# Patient Record
Sex: MALE | Race: WHITE | NOT HISPANIC OR LATINO | Employment: OTHER | URBAN - METROPOLITAN AREA
[De-identification: names, ages, dates, MRNs, and addresses within clinical notes are randomized per-mention and may not be internally consistent; named-entity substitution may affect disease eponyms.]

---

## 2016-02-08 LAB
CRC RECOMMENDATION EXT: NORMAL
CRC RECOMMENDATION EXT: NORMAL

## 2017-03-16 ENCOUNTER — HISTORICAL (OUTPATIENT)
Dept: ADMINISTRATIVE | Facility: HOSPITAL | Age: 67
End: 2017-03-16

## 2017-09-21 ENCOUNTER — HISTORICAL (OUTPATIENT)
Dept: ADMINISTRATIVE | Facility: HOSPITAL | Age: 67
End: 2017-09-21

## 2017-09-21 LAB
ALBUMIN SERPL-MCNC: 3.7 GM/DL (ref 3.4–5)
ALBUMIN/GLOB SERPL: 1.3 {RATIO}
ALP SERPL-CCNC: 70 UNIT/L (ref 50–136)
ALT SERPL-CCNC: 27 UNIT/L (ref 12–78)
AST SERPL-CCNC: 14 UNIT/L (ref 15–37)
BILIRUB SERPL-MCNC: 0.7 MG/DL (ref 0.2–1)
BILIRUBIN DIRECT+TOT PNL SERPL-MCNC: 0.2 MG/DL (ref 0–0.2)
BILIRUBIN DIRECT+TOT PNL SERPL-MCNC: 0.5 MG/DL (ref 0–0.8)
BUN SERPL-MCNC: 24 MG/DL (ref 7–18)
CALCIUM SERPL-MCNC: 8.7 MG/DL (ref 8.5–10.1)
CHLORIDE SERPL-SCNC: 103 MMOL/L (ref 98–107)
CHOLEST SERPL-MCNC: 171 MG/DL (ref 0–200)
CHOLEST/HDLC SERPL: 2.2 {RATIO} (ref 0–5)
CO2 SERPL-SCNC: 28 MMOL/L (ref 21–32)
CREAT SERPL-MCNC: 0.87 MG/DL (ref 0.7–1.3)
CREAT UR-MCNC: 89 MG/DL
EST. AVERAGE GLUCOSE BLD GHB EST-MCNC: 154 MG/DL
GLOBULIN SER-MCNC: 2.8 GM/DL (ref 2.4–3.5)
GLUCOSE SERPL-MCNC: 207 MG/DL (ref 74–106)
HBA1C MFR BLD: 7 % (ref 4.2–6.3)
HDLC SERPL-MCNC: 78 MG/DL (ref 35–60)
LDLC SERPL CALC-MCNC: 79 MG/DL (ref 0–129)
MICROALBUMIN UR-MCNC: 0.6 MG/DL
MICROALBUMIN/CREAT RATIO PNL UR: 6.7 MG/GM CR (ref 0–30)
POTASSIUM SERPL-SCNC: 4.2 MMOL/L (ref 3.5–5.1)
PROT SERPL-MCNC: 6.5 GM/DL (ref 6.4–8.2)
SODIUM SERPL-SCNC: 138 MMOL/L (ref 136–145)
TRIGL SERPL-MCNC: 72 MG/DL (ref 30–150)
VLDLC SERPL CALC-MCNC: 14 MG/DL

## 2018-03-21 ENCOUNTER — HISTORICAL (OUTPATIENT)
Dept: ADMINISTRATIVE | Facility: HOSPITAL | Age: 68
End: 2018-03-21

## 2018-03-21 LAB
ABS NEUT (OLG): 2.9 X10(3)/MCL (ref 2.1–9.2)
ALBUMIN SERPL-MCNC: 3.8 GM/DL (ref 3.4–5)
ALBUMIN/GLOB SERPL: 1.2 {RATIO}
ALP SERPL-CCNC: 83 UNIT/L (ref 50–136)
ALT SERPL-CCNC: 48 UNIT/L (ref 12–78)
AST SERPL-CCNC: 29 UNIT/L (ref 15–37)
BASOPHILS # BLD AUTO: 0 X10(3)/MCL (ref 0–0.2)
BASOPHILS NFR BLD AUTO: 1 %
BILIRUB SERPL-MCNC: 0.6 MG/DL (ref 0.2–1)
BILIRUBIN DIRECT+TOT PNL SERPL-MCNC: 0.2 MG/DL (ref 0–0.2)
BILIRUBIN DIRECT+TOT PNL SERPL-MCNC: 0.4 MG/DL (ref 0–0.8)
BUN SERPL-MCNC: 17 MG/DL (ref 7–18)
CALCIUM SERPL-MCNC: 9.1 MG/DL (ref 8.5–10.1)
CEA SERPL-MCNC: 2 NG/ML (ref 0–3)
CHLORIDE SERPL-SCNC: 104 MMOL/L (ref 98–107)
CHOLEST SERPL-MCNC: 194 MG/DL (ref 0–200)
CHOLEST/HDLC SERPL: 2.3 {RATIO} (ref 0–5)
CO2 SERPL-SCNC: 30 MMOL/L (ref 21–32)
CREAT SERPL-MCNC: 0.81 MG/DL (ref 0.7–1.3)
EOSINOPHIL # BLD AUTO: 0 X10(3)/MCL (ref 0–0.9)
EOSINOPHIL NFR BLD AUTO: 1 %
ERYTHROCYTE [DISTWIDTH] IN BLOOD BY AUTOMATED COUNT: 12.2 % (ref 11.5–17)
EST. AVERAGE GLUCOSE BLD GHB EST-MCNC: 143 MG/DL
GLOBULIN SER-MCNC: 3.1 GM/DL (ref 2.4–3.5)
GLUCOSE SERPL-MCNC: 171 MG/DL (ref 74–106)
HBA1C MFR BLD: 6.6 % (ref 4.2–6.3)
HCT VFR BLD AUTO: 43.5 % (ref 42–52)
HDLC SERPL-MCNC: 85 MG/DL (ref 35–60)
HGB BLD-MCNC: 15.4 GM/DL (ref 14–18)
LDLC SERPL CALC-MCNC: 93 MG/DL (ref 0–129)
LYMPHOCYTES # BLD AUTO: 1.1 X10(3)/MCL (ref 0.6–4.6)
LYMPHOCYTES NFR BLD AUTO: 24 %
MCH RBC QN AUTO: 33.9 PG (ref 27–31)
MCHC RBC AUTO-ENTMCNC: 35.4 GM/DL (ref 33–36)
MCV RBC AUTO: 95.8 FL (ref 80–94)
MONOCYTES # BLD AUTO: 0.6 X10(3)/MCL (ref 0.1–1.3)
MONOCYTES NFR BLD AUTO: 12 %
NEUTROPHILS # BLD AUTO: 2.9 X10(3)/MCL (ref 2.1–9.2)
NEUTROPHILS NFR BLD AUTO: 62 %
PLATELET # BLD AUTO: 132 X10(3)/MCL (ref 130–400)
PMV BLD AUTO: 8.8 FL (ref 9.4–12.4)
POTASSIUM SERPL-SCNC: 4.1 MMOL/L (ref 3.5–5.1)
PROT SERPL-MCNC: 6.9 GM/DL (ref 6.4–8.2)
PSA SERPL-MCNC: 2.35 NG/ML (ref 0–4)
RBC # BLD AUTO: 4.54 X10(6)/MCL (ref 4.7–6.1)
SODIUM SERPL-SCNC: 140 MMOL/L (ref 136–145)
TRIGL SERPL-MCNC: 79 MG/DL (ref 30–150)
TSH SERPL-ACNC: 2.5 MIU/ML (ref 0.36–3.74)
VLDLC SERPL CALC-MCNC: 16 MG/DL
WBC # SPEC AUTO: 4.7 X10(3)/MCL (ref 4.5–11.5)

## 2018-10-30 ENCOUNTER — HISTORICAL (OUTPATIENT)
Dept: ADMINISTRATIVE | Facility: HOSPITAL | Age: 68
End: 2018-10-30

## 2018-10-30 LAB
ABS NEUT (OLG): 3.1 X10(3)/MCL (ref 2.1–9.2)
ALBUMIN SERPL-MCNC: 4.1 GM/DL (ref 3.4–5)
ALBUMIN/GLOB SERPL: 1.3 RATIO (ref 1.1–2)
ALP SERPL-CCNC: 76 UNIT/L (ref 50–136)
ALT SERPL-CCNC: 49 UNIT/L (ref 12–78)
AST SERPL-CCNC: 31 UNIT/L (ref 15–37)
BASOPHILS # BLD AUTO: 0 X10(3)/MCL (ref 0–0.2)
BASOPHILS NFR BLD AUTO: 1 %
BILIRUB SERPL-MCNC: 0.6 MG/DL (ref 0.2–1)
BILIRUBIN DIRECT+TOT PNL SERPL-MCNC: 0.2 MG/DL (ref 0–0.5)
BILIRUBIN DIRECT+TOT PNL SERPL-MCNC: 0.4 MG/DL (ref 0–0.8)
BUN SERPL-MCNC: 20 MG/DL (ref 7–18)
CALCIUM SERPL-MCNC: 9.1 MG/DL (ref 8.5–10.1)
CHLORIDE SERPL-SCNC: 104 MMOL/L (ref 98–107)
CHOLEST SERPL-MCNC: 175 MG/DL (ref 0–200)
CHOLEST/HDLC SERPL: 2.2 {RATIO} (ref 0–5)
CO2 SERPL-SCNC: 30 MMOL/L (ref 21–32)
CREAT SERPL-MCNC: 0.94 MG/DL (ref 0.7–1.3)
CREAT UR-MCNC: 86 MG/DL
EOSINOPHIL # BLD AUTO: 0.1 X10(3)/MCL (ref 0–0.9)
EOSINOPHIL NFR BLD AUTO: 1 %
ERYTHROCYTE [DISTWIDTH] IN BLOOD BY AUTOMATED COUNT: 12.1 % (ref 11.5–17)
EST. AVERAGE GLUCOSE BLD GHB EST-MCNC: 151 MG/DL
GLOBULIN SER-MCNC: 3.2 GM/DL (ref 2.4–3.5)
GLUCOSE SERPL-MCNC: 184 MG/DL (ref 74–106)
HBA1C MFR BLD: 6.9 % (ref 4.2–6.3)
HCT VFR BLD AUTO: 46.5 % (ref 42–52)
HDLC SERPL-MCNC: 79 MG/DL (ref 35–60)
HGB BLD-MCNC: 15.6 GM/DL (ref 14–18)
LDLC SERPL CALC-MCNC: 70 MG/DL (ref 0–129)
LYMPHOCYTES # BLD AUTO: 1.1 X10(3)/MCL (ref 0.6–4.6)
LYMPHOCYTES NFR BLD AUTO: 23 %
MCH RBC QN AUTO: 33.2 PG (ref 27–31)
MCHC RBC AUTO-ENTMCNC: 33.5 GM/DL (ref 33–36)
MCV RBC AUTO: 98.9 FL (ref 80–94)
MICROALBUMIN UR-MCNC: 0.6 MG/DL
MICROALBUMIN/CREAT RATIO PNL UR: 7 MG/GM CR (ref 0–30)
MONOCYTES # BLD AUTO: 0.5 X10(3)/MCL (ref 0.1–1.3)
MONOCYTES NFR BLD AUTO: 11 %
NEUTROPHILS # BLD AUTO: 3.1 X10(3)/MCL (ref 2.1–9.2)
NEUTROPHILS NFR BLD AUTO: 64 %
PLATELET # BLD AUTO: 153 X10(3)/MCL (ref 130–400)
PMV BLD AUTO: 8.8 FL (ref 9.4–12.4)
POTASSIUM SERPL-SCNC: 4.7 MMOL/L (ref 3.5–5.1)
PROT SERPL-MCNC: 7.3 GM/DL (ref 6.4–8.2)
RBC # BLD AUTO: 4.7 X10(6)/MCL (ref 4.7–6.1)
SODIUM SERPL-SCNC: 139 MMOL/L (ref 136–145)
TRIGL SERPL-MCNC: 129 MG/DL (ref 30–150)
TSH SERPL-ACNC: 2.19 MIU/L (ref 0.36–3.74)
VLDLC SERPL CALC-MCNC: 26 MG/DL
WBC # SPEC AUTO: 4.9 X10(3)/MCL (ref 4.5–11.5)

## 2019-07-01 ENCOUNTER — HISTORICAL (OUTPATIENT)
Dept: ADMINISTRATIVE | Facility: HOSPITAL | Age: 69
End: 2019-07-01

## 2019-07-01 LAB
ALBUMIN SERPL-MCNC: 3.8 GM/DL (ref 3.4–5)
ALBUMIN/GLOB SERPL: 1.2 {RATIO}
ALP SERPL-CCNC: 60 UNIT/L (ref 50–136)
ALT SERPL-CCNC: 26 UNIT/L (ref 12–78)
AST SERPL-CCNC: 18 UNIT/L (ref 15–37)
BILIRUB SERPL-MCNC: 1.2 MG/DL (ref 0.2–1)
BILIRUBIN DIRECT+TOT PNL SERPL-MCNC: 0.3 MG/DL (ref 0–0.2)
BILIRUBIN DIRECT+TOT PNL SERPL-MCNC: 0.9 MG/DL (ref 0–0.8)
BUN SERPL-MCNC: 22 MG/DL (ref 7–18)
CALCIUM SERPL-MCNC: 9 MG/DL (ref 8.5–10.1)
CEA SERPL-MCNC: 1.4 NG/ML (ref 0–3)
CHLORIDE SERPL-SCNC: 101 MMOL/L (ref 98–107)
CHOLEST SERPL-MCNC: 172 MG/DL (ref 0–200)
CHOLEST/HDLC SERPL: 2.1 {RATIO} (ref 0–5)
CO2 SERPL-SCNC: 29 MMOL/L (ref 21–32)
CREAT SERPL-MCNC: 1 MG/DL (ref 0.7–1.3)
EST. AVERAGE GLUCOSE BLD GHB EST-MCNC: 120 MG/DL
GLOBULIN SER-MCNC: 3.1 GM/DL (ref 2.4–3.5)
GLUCOSE SERPL-MCNC: 148 MG/DL (ref 74–106)
HBA1C MFR BLD: 5.8 % (ref 4.2–6.3)
HDLC SERPL-MCNC: 81 MG/DL (ref 35–60)
LDLC SERPL CALC-MCNC: 74 MG/DL (ref 0–129)
POTASSIUM SERPL-SCNC: 4.2 MMOL/L (ref 3.5–5.1)
PROT SERPL-MCNC: 6.9 GM/DL (ref 6.4–8.2)
PSA SERPL-MCNC: 2.79 NG/ML (ref 0–4)
SODIUM SERPL-SCNC: 136 MMOL/L (ref 136–145)
TRIGL SERPL-MCNC: 84 MG/DL (ref 30–150)
VLDLC SERPL CALC-MCNC: 17 MG/DL

## 2020-01-07 ENCOUNTER — HISTORICAL (OUTPATIENT)
Dept: ADMINISTRATIVE | Facility: HOSPITAL | Age: 70
End: 2020-01-07

## 2020-01-07 LAB
ABS NEUT (OLG): 2.88 X10(3)/MCL (ref 2.1–9.2)
ALBUMIN SERPL-MCNC: 3.6 GM/DL (ref 3.4–5)
ALBUMIN/GLOB SERPL: 1.1 RATIO (ref 1.1–2)
ALP SERPL-CCNC: 77 UNIT/L (ref 50–136)
ALT SERPL-CCNC: 32 UNIT/L (ref 12–78)
AST SERPL-CCNC: 21 UNIT/L (ref 15–37)
BASOPHILS # BLD AUTO: 0.1 X10(3)/MCL (ref 0–0.2)
BASOPHILS NFR BLD AUTO: 2 %
BILIRUB SERPL-MCNC: 0.4 MG/DL (ref 0.2–1)
BILIRUBIN DIRECT+TOT PNL SERPL-MCNC: 0.1 MG/DL (ref 0–0.5)
BILIRUBIN DIRECT+TOT PNL SERPL-MCNC: 0.3 MG/DL (ref 0–0.8)
BUN SERPL-MCNC: 17 MG/DL (ref 7–18)
CALCIUM SERPL-MCNC: 9.2 MG/DL (ref 8.5–10.1)
CHLORIDE SERPL-SCNC: 106 MMOL/L (ref 98–107)
CHOLEST SERPL-MCNC: 183 MG/DL (ref 0–200)
CHOLEST/HDLC SERPL: 2.7 {RATIO} (ref 0–5)
CO2 SERPL-SCNC: 27 MMOL/L (ref 21–32)
CREAT SERPL-MCNC: 0.85 MG/DL (ref 0.7–1.3)
CREAT UR-MCNC: 58 MG/DL
EOSINOPHIL # BLD AUTO: 0.1 X10(3)/MCL (ref 0–0.9)
EOSINOPHIL NFR BLD AUTO: 2 %
ERYTHROCYTE [DISTWIDTH] IN BLOOD BY AUTOMATED COUNT: 11.7 % (ref 11.5–17)
EST. AVERAGE GLUCOSE BLD GHB EST-MCNC: 134 MG/DL
GLOBULIN SER-MCNC: 3.3 GM/DL (ref 2.4–3.5)
GLUCOSE SERPL-MCNC: 205 MG/DL (ref 74–106)
HBA1C MFR BLD: 6.3 % (ref 4.2–6.3)
HCT VFR BLD AUTO: 45.8 % (ref 42–52)
HDLC SERPL-MCNC: 69 MG/DL (ref 35–60)
HGB BLD-MCNC: 15.8 GM/DL (ref 14–18)
LDLC SERPL CALC-MCNC: 72 MG/DL (ref 0–129)
LYMPHOCYTES # BLD AUTO: 1.9 X10(3)/MCL (ref 0.6–4.6)
LYMPHOCYTES NFR BLD AUTO: 35 %
MCH RBC QN AUTO: 33.5 PG (ref 27–31)
MCHC RBC AUTO-ENTMCNC: 34.5 GM/DL (ref 33–36)
MCV RBC AUTO: 97 FL (ref 80–94)
MICROALBUMIN UR-MCNC: 0.5 MG/DL
MICROALBUMIN/CREAT RATIO PNL UR: 8.6 MG/GM CR (ref 0–30)
MONOCYTES # BLD AUTO: 0.5 X10(3)/MCL (ref 0.1–1.3)
MONOCYTES NFR BLD AUTO: 8 %
NEUTROPHILS # BLD AUTO: 2.88 X10(3)/MCL (ref 2.1–9.2)
NEUTROPHILS NFR BLD AUTO: 52 %
PLATELET # BLD AUTO: 221 X10(3)/MCL (ref 130–400)
PMV BLD AUTO: 8.5 FL (ref 9.4–12.4)
POTASSIUM SERPL-SCNC: 3.8 MMOL/L (ref 3.5–5.1)
PROT SERPL-MCNC: 6.9 GM/DL (ref 6.4–8.2)
RBC # BLD AUTO: 4.72 X10(6)/MCL (ref 4.7–6.1)
SODIUM SERPL-SCNC: 142 MMOL/L (ref 136–145)
TRIGL SERPL-MCNC: 208 MG/DL (ref 30–150)
TSH SERPL-ACNC: 2.75 MIU/L (ref 0.36–3.74)
VLDLC SERPL CALC-MCNC: 42 MG/DL
WBC # SPEC AUTO: 5.5 X10(3)/MCL (ref 4.5–11.5)

## 2020-07-14 ENCOUNTER — HISTORICAL (OUTPATIENT)
Dept: ADMINISTRATIVE | Facility: HOSPITAL | Age: 70
End: 2020-07-14

## 2020-07-14 LAB
ABS NEUT (OLG): 3.56 X10(3)/MCL (ref 2.1–9.2)
ALBUMIN SERPL-MCNC: 4.1 GM/DL (ref 3.4–5)
ALBUMIN/GLOB SERPL: 1.5 RATIO (ref 1.1–2)
ALP SERPL-CCNC: 62 UNIT/L (ref 40–150)
ALT SERPL-CCNC: 28 UNIT/L (ref 0–55)
AST SERPL-CCNC: 23 UNIT/L (ref 5–34)
BASOPHILS # BLD AUTO: 0 X10(3)/MCL (ref 0–0.2)
BASOPHILS NFR BLD AUTO: 1 %
BILIRUB SERPL-MCNC: 1.2 MG/DL
BILIRUBIN DIRECT+TOT PNL SERPL-MCNC: 0.4 MG/DL (ref 0–0.5)
BILIRUBIN DIRECT+TOT PNL SERPL-MCNC: 0.8 MG/DL (ref 0–0.8)
BUN SERPL-MCNC: 22.5 MG/DL (ref 8.4–25.7)
CALCIUM SERPL-MCNC: 8.9 MG/DL (ref 8.8–10)
CEA SERPL-MCNC: 2.14 MG/ML (ref 0–3)
CHLORIDE SERPL-SCNC: 100 MMOL/L (ref 98–107)
CHOLEST SERPL-MCNC: 174 MG/DL
CHOLEST/HDLC SERPL: 2 {RATIO} (ref 0–5)
CO2 SERPL-SCNC: 28 MMOL/L (ref 23–31)
CREAT SERPL-MCNC: 1.01 MG/DL (ref 0.73–1.18)
CREAT UR-MCNC: 107.8 MG/DL (ref 58–161)
EOSINOPHIL # BLD AUTO: 0.1 X10(3)/MCL (ref 0–0.9)
EOSINOPHIL NFR BLD AUTO: 1 %
ERYTHROCYTE [DISTWIDTH] IN BLOOD BY AUTOMATED COUNT: 12.2 % (ref 11.5–17)
EST. AVERAGE GLUCOSE BLD GHB EST-MCNC: 116.9 MG/DL
GLOBULIN SER-MCNC: 2.7 GM/DL (ref 2.4–3.5)
GLUCOSE SERPL-MCNC: 149 MG/DL (ref 82–115)
HBA1C MFR BLD: 5.7 %
HCT VFR BLD AUTO: 45.5 % (ref 42–52)
HDLC SERPL-MCNC: 70 MG/DL (ref 35–60)
HGB BLD-MCNC: 15.9 GM/DL (ref 14–18)
LDLC SERPL CALC-MCNC: 82 MG/DL (ref 50–140)
LYMPHOCYTES # BLD AUTO: 1.2 X10(3)/MCL (ref 0.6–4.6)
LYMPHOCYTES NFR BLD AUTO: 22 %
MCH RBC QN AUTO: 33.8 PG (ref 27–31)
MCHC RBC AUTO-ENTMCNC: 34.9 GM/DL (ref 33–36)
MCV RBC AUTO: 96.6 FL (ref 80–94)
MICROALBUMIN UR-MCNC: 5.4 UG/ML
MICROALBUMIN/CREAT RATIO PNL UR: 5 MG/GM CR (ref 0–30)
MONOCYTES # BLD AUTO: 0.6 X10(3)/MCL (ref 0.1–1.3)
MONOCYTES NFR BLD AUTO: 11 %
NEUTROPHILS # BLD AUTO: 3.56 X10(3)/MCL (ref 2.1–9.2)
NEUTROPHILS NFR BLD AUTO: 64 %
PLATELET # BLD AUTO: 166 X10(3)/MCL (ref 130–400)
PMV BLD AUTO: 8.7 FL (ref 9.4–12.4)
POTASSIUM SERPL-SCNC: 4.1 MMOL/L (ref 3.5–5.1)
PROT SERPL-MCNC: 6.8 GM/DL (ref 5.8–7.6)
PSA SERPL-MCNC: 2.72 NG/ML
RBC # BLD AUTO: 4.71 X10(6)/MCL (ref 4.7–6.1)
SODIUM SERPL-SCNC: 139 MMOL/L (ref 136–145)
TRIGL SERPL-MCNC: 111 MG/DL (ref 34–140)
TSH SERPL-ACNC: 1.9 UIU/ML (ref 0.35–4.94)
VLDLC SERPL CALC-MCNC: 22 MG/DL
WBC # SPEC AUTO: 5.6 X10(3)/MCL (ref 4.5–11.5)

## 2020-07-31 LAB — CRC RECOMMENDATION EXT: NORMAL

## 2021-01-15 ENCOUNTER — HISTORICAL (OUTPATIENT)
Dept: ADMINISTRATIVE | Facility: HOSPITAL | Age: 71
End: 2021-01-15

## 2021-01-15 LAB
ABS NEUT (OLG): 3.07 X10(3)/MCL (ref 2.1–9.2)
ALBUMIN SERPL-MCNC: 4.1 GM/DL (ref 3.4–4.8)
ALBUMIN/GLOB SERPL: 1.5 RATIO (ref 1.1–2)
ALP SERPL-CCNC: 66 UNIT/L (ref 40–150)
ALT SERPL-CCNC: 31 UNIT/L (ref 0–55)
AST SERPL-CCNC: 26 UNIT/L (ref 5–34)
BASOPHILS # BLD AUTO: 0.1 X10(3)/MCL (ref 0–0.2)
BASOPHILS NFR BLD AUTO: 1 %
BILIRUB SERPL-MCNC: 1 MG/DL
BILIRUBIN DIRECT+TOT PNL SERPL-MCNC: 0.4 MG/DL (ref 0–0.5)
BILIRUBIN DIRECT+TOT PNL SERPL-MCNC: 0.6 MG/DL (ref 0–0.8)
BUN SERPL-MCNC: 17.8 MG/DL (ref 8.4–25.7)
CALCIUM SERPL-MCNC: 9.2 MG/DL (ref 8.8–10)
CHLORIDE SERPL-SCNC: 99 MMOL/L (ref 98–107)
CHOLEST SERPL-MCNC: 185 MG/DL
CHOLEST/HDLC SERPL: 2 {RATIO} (ref 0–5)
CO2 SERPL-SCNC: 30 MMOL/L (ref 23–31)
CREAT SERPL-MCNC: 0.77 MG/DL (ref 0.73–1.18)
CREAT UR-MCNC: 99.7 MG/DL (ref 58–161)
EOSINOPHIL # BLD AUTO: 0.1 X10(3)/MCL (ref 0–0.9)
EOSINOPHIL NFR BLD AUTO: 2 %
ERYTHROCYTE [DISTWIDTH] IN BLOOD BY AUTOMATED COUNT: 12.3 % (ref 11.5–17)
EST. AVERAGE GLUCOSE BLD GHB EST-MCNC: 128.4 MG/DL
GLOBULIN SER-MCNC: 2.7 GM/DL (ref 2.4–3.5)
GLUCOSE SERPL-MCNC: 140 MG/DL (ref 82–115)
HBA1C MFR BLD: 6.1 %
HCT VFR BLD AUTO: 44.6 % (ref 42–52)
HDLC SERPL-MCNC: 83 MG/DL (ref 35–60)
HGB BLD-MCNC: 15.3 GM/DL (ref 14–18)
LDLC SERPL CALC-MCNC: 84 MG/DL (ref 50–140)
LYMPHOCYTES # BLD AUTO: 1.4 X10(3)/MCL (ref 0.6–4.6)
LYMPHOCYTES NFR BLD AUTO: 26 %
MCH RBC QN AUTO: 34.4 PG (ref 27–31)
MCHC RBC AUTO-ENTMCNC: 34.3 GM/DL (ref 33–36)
MCV RBC AUTO: 100.2 FL (ref 80–94)
MICROALBUMIN UR-MCNC: 7.1 UG/ML
MICROALBUMIN/CREAT RATIO PNL UR: 7.1 MG/GM CR (ref 0–30)
MONOCYTES # BLD AUTO: 0.6 X10(3)/MCL (ref 0.1–1.3)
MONOCYTES NFR BLD AUTO: 12 %
NEUTROPHILS # BLD AUTO: 3.07 X10(3)/MCL (ref 2.1–9.2)
NEUTROPHILS NFR BLD AUTO: 58 %
PLATELET # BLD AUTO: 156 X10(3)/MCL (ref 130–400)
PMV BLD AUTO: 9 FL (ref 9.4–12.4)
POTASSIUM SERPL-SCNC: 4.5 MMOL/L (ref 3.5–5.1)
PROT SERPL-MCNC: 6.8 GM/DL (ref 5.8–7.6)
RBC # BLD AUTO: 4.45 X10(6)/MCL (ref 4.7–6.1)
SODIUM SERPL-SCNC: 140 MMOL/L (ref 136–145)
TRIGL SERPL-MCNC: 92 MG/DL (ref 34–140)
TSH SERPL-ACNC: 3.02 UIU/ML (ref 0.35–4.94)
VLDLC SERPL CALC-MCNC: 18 MG/DL
WBC # SPEC AUTO: 5.2 X10(3)/MCL (ref 4.5–11.5)

## 2021-07-21 ENCOUNTER — HISTORICAL (OUTPATIENT)
Dept: ADMINISTRATIVE | Facility: HOSPITAL | Age: 71
End: 2021-07-21

## 2021-07-21 LAB
ABS NEUT (OLG): 2.97 X10(3)/MCL (ref 2.1–9.2)
ALBUMIN SERPL-MCNC: 3.8 GM/DL (ref 3.4–4.8)
ALBUMIN/GLOB SERPL: 1.4 RATIO (ref 1.1–2)
ALP SERPL-CCNC: 73 UNIT/L (ref 40–150)
ALT SERPL-CCNC: 33 UNIT/L (ref 0–55)
AST SERPL-CCNC: 23 UNIT/L (ref 5–34)
BASOPHILS # BLD AUTO: 0 X10(3)/MCL (ref 0–0.2)
BASOPHILS NFR BLD AUTO: 1 %
BILIRUB SERPL-MCNC: 0.9 MG/DL
BILIRUBIN DIRECT+TOT PNL SERPL-MCNC: 0.4 MG/DL (ref 0–0.5)
BILIRUBIN DIRECT+TOT PNL SERPL-MCNC: 0.5 MG/DL (ref 0–0.8)
BUN SERPL-MCNC: 18.6 MG/DL (ref 8.4–25.7)
CALCIUM SERPL-MCNC: 9.1 MG/DL (ref 8.8–10)
CHLORIDE SERPL-SCNC: 100 MMOL/L (ref 98–107)
CHOLEST SERPL-MCNC: 189 MG/DL
CHOLEST/HDLC SERPL: 3 {RATIO} (ref 0–5)
CO2 SERPL-SCNC: 28 MMOL/L (ref 23–31)
CREAT SERPL-MCNC: 0.86 MG/DL (ref 0.73–1.18)
EOSINOPHIL # BLD AUTO: 0.1 X10(3)/MCL (ref 0–0.9)
EOSINOPHIL NFR BLD AUTO: 2 %
ERYTHROCYTE [DISTWIDTH] IN BLOOD BY AUTOMATED COUNT: 12.1 % (ref 11.5–17)
EST. AVERAGE GLUCOSE BLD GHB EST-MCNC: 148.5 MG/DL
GLOBULIN SER-MCNC: 2.8 GM/DL (ref 2.4–3.5)
GLUCOSE SERPL-MCNC: 199 MG/DL (ref 82–115)
HBA1C MFR BLD: 6.8 %
HCT VFR BLD AUTO: 45.2 % (ref 42–52)
HDLC SERPL-MCNC: 65 MG/DL (ref 35–60)
HGB BLD-MCNC: 15.8 GM/DL (ref 14–18)
LDLC SERPL CALC-MCNC: 93 MG/DL (ref 50–140)
LYMPHOCYTES # BLD AUTO: 1.4 X10(3)/MCL (ref 0.6–4.6)
LYMPHOCYTES NFR BLD AUTO: 28 %
MCH RBC QN AUTO: 34.7 PG (ref 27–31)
MCHC RBC AUTO-ENTMCNC: 35 GM/DL (ref 33–36)
MCV RBC AUTO: 99.3 FL (ref 80–94)
MONOCYTES # BLD AUTO: 0.6 X10(3)/MCL (ref 0.1–1.3)
MONOCYTES NFR BLD AUTO: 11 %
NEUTROPHILS # BLD AUTO: 2.97 X10(3)/MCL (ref 2.1–9.2)
NEUTROPHILS NFR BLD AUTO: 58 %
PLATELET # BLD AUTO: 159 X10(3)/MCL (ref 130–400)
PMV BLD AUTO: 9.5 FL (ref 9.4–12.4)
POTASSIUM SERPL-SCNC: 4 MMOL/L (ref 3.5–5.1)
PROT SERPL-MCNC: 6.6 GM/DL (ref 5.8–7.6)
RBC # BLD AUTO: 4.55 X10(6)/MCL (ref 4.7–6.1)
SODIUM SERPL-SCNC: 140 MMOL/L (ref 136–145)
TRIGL SERPL-MCNC: 156 MG/DL (ref 34–140)
TSH SERPL-ACNC: 2.65 UIU/ML (ref 0.35–4.94)
VLDLC SERPL CALC-MCNC: 31 MG/DL
WBC # SPEC AUTO: 5.1 X10(3)/MCL (ref 4.5–11.5)

## 2022-01-01 ENCOUNTER — TELEPHONE (OUTPATIENT)
Dept: INTERNAL MEDICINE | Facility: CLINIC | Age: 72
End: 2022-01-01
Payer: MEDICARE

## 2022-01-01 ENCOUNTER — DOCUMENTATION ONLY (OUTPATIENT)
Dept: ADMINISTRATIVE | Facility: HOSPITAL | Age: 72
End: 2022-01-01
Payer: MEDICARE

## 2022-01-01 ENCOUNTER — DOCUMENTATION ONLY (OUTPATIENT)
Dept: INTERNAL MEDICINE | Facility: CLINIC | Age: 72
End: 2022-01-01
Payer: MEDICARE

## 2022-01-01 ENCOUNTER — LAB VISIT (OUTPATIENT)
Dept: LAB | Facility: HOSPITAL | Age: 72
End: 2022-01-01
Attending: INTERNAL MEDICINE
Payer: MEDICARE

## 2022-01-01 ENCOUNTER — OFFICE VISIT (OUTPATIENT)
Dept: INTERNAL MEDICINE | Facility: CLINIC | Age: 72
End: 2022-01-01
Payer: MEDICARE

## 2022-01-01 ENCOUNTER — OFFICE VISIT (OUTPATIENT)
Dept: URGENT CARE | Facility: CLINIC | Age: 72
End: 2022-01-01
Payer: MEDICARE

## 2022-01-01 VITALS
SYSTOLIC BLOOD PRESSURE: 129 MMHG | OXYGEN SATURATION: 96 % | BODY MASS INDEX: 31.15 KG/M2 | TEMPERATURE: 99 F | RESPIRATION RATE: 16 BRPM | HEIGHT: 72 IN | HEART RATE: 75 BPM | WEIGHT: 230 LBS | DIASTOLIC BLOOD PRESSURE: 76 MMHG

## 2022-01-01 VITALS
WEIGHT: 233 LBS | HEART RATE: 81 BPM | SYSTOLIC BLOOD PRESSURE: 124 MMHG | RESPIRATION RATE: 18 BRPM | DIASTOLIC BLOOD PRESSURE: 72 MMHG | BODY MASS INDEX: 31.56 KG/M2 | OXYGEN SATURATION: 97 % | HEIGHT: 72 IN

## 2022-01-01 VITALS
OXYGEN SATURATION: 97 % | SYSTOLIC BLOOD PRESSURE: 126 MMHG | RESPIRATION RATE: 18 BRPM | BODY MASS INDEX: 32.37 KG/M2 | HEIGHT: 72 IN | WEIGHT: 239 LBS | HEART RATE: 91 BPM | DIASTOLIC BLOOD PRESSURE: 72 MMHG

## 2022-01-01 DIAGNOSIS — Z00.00 ANNUAL PHYSICAL EXAM: ICD-10-CM

## 2022-01-01 DIAGNOSIS — E11.9 TYPE 2 DIABETES MELLITUS WITHOUT COMPLICATION, UNSPECIFIED WHETHER LONG TERM INSULIN USE: ICD-10-CM

## 2022-01-01 DIAGNOSIS — J01.00 ACUTE MAXILLARY SINUSITIS, RECURRENCE NOT SPECIFIED: Primary | ICD-10-CM

## 2022-01-01 DIAGNOSIS — I10 HYPERTENSION, UNSPECIFIED TYPE: ICD-10-CM

## 2022-01-01 DIAGNOSIS — E78.00 HYPERCHOLESTEROLEMIA: ICD-10-CM

## 2022-01-01 DIAGNOSIS — R53.83 FATIGUE, UNSPECIFIED TYPE: ICD-10-CM

## 2022-01-01 DIAGNOSIS — E78.00 HYPERCHOLESTEREMIA: ICD-10-CM

## 2022-01-01 DIAGNOSIS — E11.9 TYPE 2 DIABETES MELLITUS WITHOUT COMPLICATION, WITHOUT LONG-TERM CURRENT USE OF INSULIN: ICD-10-CM

## 2022-01-01 DIAGNOSIS — E13.9 OTHER SPECIFIED DIABETES MELLITUS WITHOUT COMPLICATION, WITHOUT LONG-TERM CURRENT USE OF INSULIN: ICD-10-CM

## 2022-01-01 DIAGNOSIS — J06.9 UPPER RESPIRATORY TRACT INFECTION, UNSPECIFIED TYPE: Primary | ICD-10-CM

## 2022-01-01 DIAGNOSIS — E13.9 OTHER SPECIFIED DIABETES MELLITUS WITHOUT COMPLICATION, WITHOUT LONG-TERM CURRENT USE OF INSULIN: Primary | ICD-10-CM

## 2022-01-01 DIAGNOSIS — E11.9 TYPE 2 DIABETES MELLITUS WITHOUT COMPLICATION, UNSPECIFIED WHETHER LONG TERM INSULIN USE: Primary | ICD-10-CM

## 2022-01-01 DIAGNOSIS — C20 ADENOCARCINOMA OF RECTUM: ICD-10-CM

## 2022-01-01 DIAGNOSIS — E78.00 HYPERCHOLESTEROLEMIA: Primary | ICD-10-CM

## 2022-01-01 LAB
ALBUMIN SERPL-MCNC: 3.8 GM/DL (ref 3.4–4.8)
ALBUMIN SERPL-MCNC: 4.1 GM/DL (ref 3.4–4.8)
ALBUMIN/GLOB SERPL: 1.5 RATIO (ref 1.1–2)
ALBUMIN/GLOB SERPL: 1.6 RATIO (ref 1.1–2)
ALP SERPL-CCNC: 59 UNIT/L (ref 40–150)
ALP SERPL-CCNC: 80 UNIT/L (ref 40–150)
ALT SERPL-CCNC: 14 UNIT/L (ref 0–55)
ALT SERPL-CCNC: 22 UNIT/L (ref 0–55)
APPEARANCE UR: CLEAR
AST SERPL-CCNC: 18 UNIT/L (ref 5–34)
AST SERPL-CCNC: 19 UNIT/L (ref 5–34)
BACTERIA #/AREA URNS AUTO: NORMAL /HPF
BILIRUB UR QL STRIP.AUTO: NEGATIVE MG/DL
BILIRUBIN DIRECT+TOT PNL SERPL-MCNC: 0.8 MG/DL
BILIRUBIN DIRECT+TOT PNL SERPL-MCNC: 1.1 MG/DL
BUN SERPL-MCNC: 15.6 MG/DL (ref 8.4–25.7)
BUN SERPL-MCNC: 22 MG/DL (ref 8.4–25.7)
CALCIUM SERPL-MCNC: 9 MG/DL (ref 8.8–10)
CALCIUM SERPL-MCNC: 9.6 MG/DL (ref 8.8–10)
CHLORIDE SERPL-SCNC: 101 MMOL/L (ref 98–107)
CHLORIDE SERPL-SCNC: 99 MMOL/L (ref 98–107)
CHOLEST SERPL-MCNC: 186 MG/DL
CHOLEST/HDLC SERPL: 3 {RATIO} (ref 0–5)
CO2 SERPL-SCNC: 28 MMOL/L (ref 23–31)
CO2 SERPL-SCNC: 29 MMOL/L (ref 23–31)
COLOR UR AUTO: YELLOW
CREAT SERPL-MCNC: 0.82 MG/DL (ref 0.73–1.18)
CREAT SERPL-MCNC: 0.87 MG/DL (ref 0.73–1.18)
CREAT UR-MCNC: 174.3 MG/DL (ref 63–166)
EST. AVERAGE GLUCOSE BLD GHB EST-MCNC: 128.4 MG/DL
EST. AVERAGE GLUCOSE BLD GHB EST-MCNC: 188.6 MG/DL
GFR SERPLBLD CREATININE-BSD FMLA CKD-EPI: >60 MLS/MIN/1.73/M2
GLOBULIN SER-MCNC: 2.6 GM/DL (ref 2.4–3.5)
GLOBULIN SER-MCNC: 2.6 GM/DL (ref 2.4–3.5)
GLUCOSE SERPL-MCNC: 145 MG/DL (ref 82–115)
GLUCOSE SERPL-MCNC: 278 MG/DL (ref 82–115)
GLUCOSE UR QL STRIP.AUTO: ABNORMAL MG/DL
HBA1C MFR BLD: 6.1 %
HBA1C MFR BLD: 8.2 %
HDLC SERPL-MCNC: 62 MG/DL (ref 35–60)
KETONES UR QL STRIP.AUTO: ABNORMAL MG/DL
LDLC SERPL CALC-MCNC: 92 MG/DL (ref 50–140)
LEUKOCYTE ESTERASE UR QL STRIP.AUTO: NEGATIVE UNIT/L
MICROALBUMIN UR-MCNC: 14 UG/ML
MICROALBUMIN/CREAT RATIO PNL UR: 8 MG/GM CR (ref 0–30)
NITRITE UR QL STRIP.AUTO: NEGATIVE
PH UR STRIP.AUTO: 6 [PH]
POTASSIUM SERPL-SCNC: 3.6 MMOL/L (ref 3.5–5.1)
POTASSIUM SERPL-SCNC: 4.3 MMOL/L (ref 3.5–5.1)
PROT SERPL-MCNC: 6.4 GM/DL (ref 5.8–7.6)
PROT SERPL-MCNC: 6.7 GM/DL (ref 5.8–7.6)
PROT UR QL STRIP.AUTO: NEGATIVE MG/DL
RBC #/AREA URNS AUTO: <5 /HPF
RBC UR QL AUTO: NEGATIVE UNIT/L
SODIUM SERPL-SCNC: 136 MMOL/L (ref 136–145)
SODIUM SERPL-SCNC: 137 MMOL/L (ref 136–145)
SP GR UR STRIP.AUTO: 1.03 (ref 1–1.03)
SQUAMOUS #/AREA URNS AUTO: <5 /HPF
TRIGL SERPL-MCNC: 158 MG/DL (ref 34–140)
TSH SERPL-ACNC: 2.95 UIU/ML (ref 0.35–4.94)
UROBILINOGEN UR STRIP-ACNC: 1 MG/DL
VLDLC SERPL CALC-MCNC: 32 MG/DL
WBC #/AREA URNS AUTO: <5 /HPF

## 2022-01-01 PROCEDURE — 84443 ASSAY THYROID STIM HORMONE: CPT

## 2022-01-01 PROCEDURE — 83036 HEMOGLOBIN GLYCOSYLATED A1C: CPT

## 2022-01-01 PROCEDURE — 99213 OFFICE O/P EST LOW 20 MIN: CPT | Mod: ,,, | Performed by: FAMILY MEDICINE

## 2022-01-01 PROCEDURE — 99213 OFFICE O/P EST LOW 20 MIN: CPT | Mod: ,,, | Performed by: INTERNAL MEDICINE

## 2022-01-01 PROCEDURE — 99213 PR OFFICE/OUTPT VISIT, EST, LEVL III, 20-29 MIN: ICD-10-PCS | Mod: ,,, | Performed by: FAMILY MEDICINE

## 2022-01-01 PROCEDURE — 99213 PR OFFICE/OUTPT VISIT, EST, LEVL III, 20-29 MIN: ICD-10-PCS | Mod: ,,, | Performed by: INTERNAL MEDICINE

## 2022-01-01 PROCEDURE — 80061 LIPID PANEL: CPT

## 2022-01-01 PROCEDURE — 99214 PR OFFICE/OUTPT VISIT, EST, LEVL IV, 30-39 MIN: ICD-10-PCS | Mod: ,,, | Performed by: INTERNAL MEDICINE

## 2022-01-01 PROCEDURE — 81001 URINALYSIS AUTO W/SCOPE: CPT

## 2022-01-01 PROCEDURE — 80053 COMPREHEN METABOLIC PANEL: CPT

## 2022-01-01 PROCEDURE — 36415 COLL VENOUS BLD VENIPUNCTURE: CPT

## 2022-01-01 PROCEDURE — 99214 OFFICE O/P EST MOD 30 MIN: CPT | Mod: ,,, | Performed by: INTERNAL MEDICINE

## 2022-01-01 PROCEDURE — 82043 UR ALBUMIN QUANTITATIVE: CPT

## 2022-01-01 RX ORDER — CEFDINIR 300 MG/1
300 CAPSULE ORAL 2 TIMES DAILY
Qty: 14 CAPSULE | Refills: 0 | Status: SHIPPED | OUTPATIENT
Start: 2022-01-01 | End: 2022-01-01

## 2022-01-01 RX ORDER — METHYLPREDNISOLONE 4 MG/1
TABLET ORAL
Qty: 21 EACH | Refills: 0 | Status: SHIPPED | OUTPATIENT
Start: 2022-01-01 | End: 2023-01-01 | Stop reason: ALTCHOICE

## 2022-01-01 RX ORDER — HYDROCHLOROTHIAZIDE 12.5 MG/1
TABLET ORAL
COMMUNITY
Start: 2022-02-24 | End: 2022-01-01

## 2022-01-01 RX ORDER — GLYBURIDE 5 MG/1
5 TABLET ORAL
COMMUNITY
End: 2022-01-01

## 2022-01-01 RX ORDER — METFORMIN HYDROCHLORIDE 500 MG/1
500 TABLET, EXTENDED RELEASE ORAL 2 TIMES DAILY WITH MEALS
Qty: 180 TABLET | Refills: 2 | Status: SHIPPED | OUTPATIENT
Start: 2022-01-01 | End: 2023-01-01 | Stop reason: ALTCHOICE

## 2022-01-01 RX ORDER — METFORMIN HYDROCHLORIDE 500 MG/1
TABLET, EXTENDED RELEASE ORAL
COMMUNITY
Start: 2022-01-01 | End: 2022-01-01

## 2022-01-01 RX ORDER — PIOGLITAZONEHYDROCHLORIDE 30 MG/1
30 TABLET ORAL DAILY
Qty: 90 TABLET | Refills: 3 | Status: SHIPPED | OUTPATIENT
Start: 2022-01-01 | End: 2023-07-11

## 2022-01-01 RX ORDER — METFORMIN HYDROCHLORIDE 500 MG/1
500 TABLET, EXTENDED RELEASE ORAL
COMMUNITY
Start: 2022-01-20 | End: 2023-01-01 | Stop reason: SDUPTHER

## 2022-01-24 LAB
LEFT EYE DM RETINOPATHY: NEGATIVE
RIGHT EYE DM RETINOPATHY: NEGATIVE

## 2022-01-28 ENCOUNTER — HISTORICAL (OUTPATIENT)
Dept: ADMINISTRATIVE | Facility: HOSPITAL | Age: 72
End: 2022-01-28

## 2022-01-28 LAB
ABS NEUT (OLG): 3.36 (ref 2.1–9.2)
ALBUMIN SERPL-MCNC: 4.2 G/DL (ref 3.4–4.8)
ALBUMIN/GLOB SERPL: 1.4 {RATIO} (ref 1.1–2)
ALP SERPL-CCNC: 66 U/L (ref 40–150)
ALT SERPL-CCNC: 44 U/L (ref 0–55)
APPEARANCE, UA: CLEAR
AST SERPL-CCNC: 32 U/L (ref 5–34)
BACTERIA SPEC CULT: NORMAL
BASOPHILS # BLD AUTO: 0 10*3/UL (ref 0–0.2)
BASOPHILS NFR BLD AUTO: 1 %
BILIRUB SERPL-MCNC: 1.1 MG/DL
BILIRUB UR QL STRIP: NEGATIVE
BILIRUBIN DIRECT+TOT PNL SERPL-MCNC: 0.5 (ref 0–0.5)
BILIRUBIN DIRECT+TOT PNL SERPL-MCNC: 0.6 (ref 0–0.8)
BUDDING YEAST: NORMAL
BUN SERPL-MCNC: 18.3 MG/DL (ref 8.4–25.7)
CALCIUM SERPL-MCNC: 9.8 MG/DL (ref 8.7–10.5)
CASTS, UA: NORMAL
CHLORIDE SERPL-SCNC: 100 MMOL/L (ref 98–107)
CHOLEST SERPL-MCNC: 181 MG/DL
CHOLEST/HDLC SERPL: 3 {RATIO} (ref 0–5)
CO2 SERPL-SCNC: 27 MMOL/L (ref 23–31)
COLOR UR: YELLOW
CREAT SERPL-MCNC: 0.8 MG/DL (ref 0.73–1.18)
CRYSTALS: NORMAL
EOSINOPHIL # BLD AUTO: 0 10*3/UL (ref 0–0.9)
EOSINOPHIL NFR BLD AUTO: 0 %
ERYTHROCYTE [DISTWIDTH] IN BLOOD BY AUTOMATED COUNT: 12 % (ref 11.5–17)
EST. AVERAGE GLUCOSE BLD GHB EST-MCNC: 151.3 MG/DL
GLOBULIN SER-MCNC: 3 G/DL (ref 2.4–3.5)
GLUCOSE (UA): NORMAL
GLUCOSE SERPL-MCNC: 181 MG/DL (ref 82–115)
HBA1C MFR BLD: 6.9 %
HCT VFR BLD AUTO: 43.5 % (ref 42–52)
HDLC SERPL-MCNC: 72 MG/DL (ref 35–60)
HEMOLYSIS INTERF INDEX SERPL-ACNC: <0
HGB BLD-MCNC: 14.9 G/DL (ref 14–18)
HGB UR QL STRIP: NEGATIVE
ICTERIC INTERF INDEX SERPL-ACNC: 1
KETONES UR QL STRIP: NORMAL
LDLC SERPL CALC-MCNC: 92 MG/DL (ref 50–140)
LEUKOCYTE ESTERASE UR QL STRIP: NEGATIVE
LIPEMIC INTERF INDEX SERPL-ACNC: 1
LYMPHOCYTES # BLD AUTO: 1 10*3/UL (ref 0.6–4.6)
LYMPHOCYTES NFR BLD AUTO: 19 %
MANUAL DIFF? (OHS): NO
MCH RBC QN AUTO: 34.3 PG (ref 27–31)
MCHC RBC AUTO-ENTMCNC: 34.3 G/DL (ref 33–36)
MCV RBC AUTO: 100.2 FL (ref 80–94)
MONOCYTES # BLD AUTO: 0.6 10*3/UL (ref 0.1–1.3)
MONOCYTES NFR BLD AUTO: 11 %
NEUTROPHILS # BLD AUTO: 3.36 10*3/UL (ref 2.1–9.2)
NEUTROPHILS NFR BLD AUTO: 68 %
NITRITE UR QL STRIP: NEGATIVE
PH UR STRIP: 8 [PH] (ref 5–9)
PLATELET # BLD AUTO: 150 10*3/UL (ref 130–400)
PMV BLD AUTO: 9.2 FL (ref 9.4–12.4)
POTASSIUM SERPL-SCNC: 4.3 MMOL/L (ref 3.5–5.1)
PROT SERPL-MCNC: 7.2 G/DL (ref 5.8–7.6)
PROT UR QL STRIP: NEGATIVE
PSA SERPL-MCNC: 3.18 NG/ML
RBC # BLD AUTO: 4.34 10*6/UL (ref 4.7–6.1)
RBC #/AREA URNS HPF: NORMAL /[HPF] (ref 0–2)
SMALL ROUND CELLS, UA: NORMAL
SODIUM SERPL-SCNC: 139 MMOL/L (ref 136–145)
SP GR UR STRIP: >=1.04 (ref 1–1.03)
SPERM URNS QL MICRO: NORMAL
SQUAMOUS EPITHELIAL, UA: NORMAL (ref 0–4)
TRIGL SERPL-MCNC: 87 MG/DL (ref 34–140)
TSH SERPL-ACNC: 1.92 M[IU]/L (ref 0.35–4.94)
UROBILINOGEN UR STRIP-ACNC: 1
VLDLC SERPL CALC-MCNC: 17 MG/DL
WBC # SPEC AUTO: 5 10*3/UL (ref 4.5–11.5)
WBC #/AREA URNS HPF: NORMAL /[HPF] (ref 0–2)

## 2022-04-05 ENCOUNTER — HISTORICAL (OUTPATIENT)
Dept: ADMINISTRATIVE | Facility: HOSPITAL | Age: 72
End: 2022-04-05

## 2022-04-05 LAB
ABS NEUT (OLG): 3.06 (ref 2.1–9.2)
BASOPHILS # BLD AUTO: 0 10*3/UL (ref 0–0.2)
BASOPHILS NFR BLD AUTO: 1 %
EOSINOPHIL # BLD AUTO: 0 10*3/UL (ref 0–0.9)
EOSINOPHIL NFR BLD AUTO: 1 %
ERYTHROCYTE [DISTWIDTH] IN BLOOD BY AUTOMATED COUNT: 12.2 % (ref 11.5–17)
HCT VFR BLD AUTO: 40 % (ref 42–52)
HGB BLD-MCNC: 14.3 G/DL (ref 14–18)
LYMPHOCYTES # BLD AUTO: 1.4 10*3/UL (ref 0.6–4.6)
LYMPHOCYTES NFR BLD AUTO: 27 %
MANUAL DIFF? (OHS): NO
MCH RBC QN AUTO: 34.7 PG (ref 27–31)
MCHC RBC AUTO-ENTMCNC: 35.8 G/DL (ref 33–36)
MCV RBC AUTO: 97.1 FL (ref 80–94)
MONOCYTES # BLD AUTO: 0.6 10*3/UL (ref 0.1–1.3)
MONOCYTES NFR BLD AUTO: 11 %
NEUTROPHILS # BLD AUTO: 3.06 10*3/UL (ref 2.1–9.2)
NEUTROPHILS NFR BLD AUTO: 59 %
PLATELET # BLD AUTO: 167 10*3/UL (ref 130–400)
PMV BLD AUTO: 9.5 FL (ref 9.4–12.4)
RBC # BLD AUTO: 4.12 10*6/UL (ref 4.7–6.1)
WBC # SPEC AUTO: 5.2 10*3/UL (ref 4.5–11.5)

## 2022-04-11 ENCOUNTER — HISTORICAL (OUTPATIENT)
Dept: ADMINISTRATIVE | Facility: HOSPITAL | Age: 72
End: 2022-04-11
Payer: MEDICARE

## 2022-04-29 VITALS
BODY MASS INDEX: 31.97 KG/M2 | HEIGHT: 72 IN | OXYGEN SATURATION: 96 % | DIASTOLIC BLOOD PRESSURE: 76 MMHG | SYSTOLIC BLOOD PRESSURE: 118 MMHG | WEIGHT: 236 LBS

## 2022-07-18 NOTE — TELEPHONE ENCOUNTER
----- Message from Lisha Julien sent at 7/18/2022  1:40 PM CDT -----  Regarding: RE: stephie spann 7/25 @ 9:20  Attempt to contact patient.  No answer.  Message left via voicemail  ----- Message -----  From: vIan Sanders LPN  Sent: 7/18/2022   9:52 AM CDT  To: Lisha Julien  Subject: stephie spann 7/25 @ 9:20                          Are there any outstanding tasks in patient chart? Needs fasting labs    Is there documentation of outstanding tasks in patient chart? no    Has patient been to the ER, urgent care, or another physician since last visit?    Has patient done any blood work or x-rays since last visit?

## 2022-07-25 PROBLEM — E78.00 HYPERCHOLESTEROLEMIA: Status: ACTIVE | Noted: 2022-01-01

## 2022-07-25 PROBLEM — I10 HYPERTENSION: Status: ACTIVE | Noted: 2022-01-01

## 2022-07-25 PROBLEM — Z00.00 ANNUAL PHYSICAL EXAM: Status: ACTIVE | Noted: 2022-01-01

## 2022-07-25 PROBLEM — E11.9 DIABETES MELLITUS: Status: ACTIVE | Noted: 2022-01-01

## 2022-07-25 NOTE — PROGRESS NOTES
Patient ID: Ace Gomes is a 71 y.o. male.    Chief Complaint: Medicare AWV (Labs 7/22)      HPI:   Patient presents here today for above reason.     Ace is a 71-year-old gentleman presenting today 6 month follow-up.  A1c did go up a 0.2 however this is likely from medication adjustment.  He was on Xigduo which well was quite expensive and also Trulicity along with glyburide.  We changed him to extended release metformin he was on 500 mg a day.  Rest of labs are stable a age-appropriate screening is up-to-date here for follow-up.    The patient's Health Maintenance was reviewed and the following appears to be due at this time:   Health Maintenance Due   Topic Date Due    Hepatitis C Screening  Never done    Foot Exam  Never done    Shingles Vaccine (1 of 2) Never done    Abdominal Aortic Aneurysm Screening  Never done    COVID-19 Vaccine (3 - Booster for Pfizer series) 07/04/2021    Diabetes Urine Screening  01/15/2022    Pneumococcal Vaccines (Age 65+) (2 - PCV) 07/23/2022        Past Medical History:  History reviewed. No pertinent past medical history.  Past Surgical History:   Procedure Laterality Date    COLONOSCOPY      LIVER SURGERY      thumb surgery Left      Review of patient's allergies indicates:  No Known Allergies  Current Outpatient Medications on File Prior to Visit   Medication Sig Dispense Refill    felodipine (PLENDIL) 5 MG 24 hr tablet TAKE ONE TABLET BY MOUTH EVERY DAY 90 tablet 3    glyBURIDE (DIABETA) 5 MG tablet Take 5 mg by mouth.      hydroCHLOROthiazide (HYDRODIURIL) 12.5 MG Tab       metFORMIN (GLUCOPHAGE-XR) 500 MG ER 24hr tablet       simvastatin (ZOCOR) 40 MG tablet TAKE ONE TABLET BY MOUTH EVERY DAY AT BEDTIME 90 tablet 2    TRULICITY 1.5 mg/0.5 mL pen injector INJECT ONE PEN SUB-Q ONCE WEEKLY 4 pen 3     No current facility-administered medications on file prior to visit.     Social History     Socioeconomic History    Marital status:    Tobacco Use     Smoking status: Former Smoker    Smokeless tobacco: Never Used   Substance and Sexual Activity    Alcohol use: Yes    Drug use: Never    Sexual activity: Yes     Social Determinants of Health     Financial Resource Strain: Low Risk     Difficulty of Paying Living Expenses: Not hard at all   Food Insecurity: No Food Insecurity    Worried About Running Out of Food in the Last Year: Never true    Ran Out of Food in the Last Year: Never true   Transportation Needs: No Transportation Needs    Lack of Transportation (Medical): No    Lack of Transportation (Non-Medical): No   Stress: No Stress Concern Present    Feeling of Stress : Not at all   Housing Stability: Low Risk     Unable to Pay for Housing in the Last Year: No    Number of Places Lived in the Last Year: 1    Unstable Housing in the Last Year: No     Family History   Problem Relation Age of Onset    Lung cancer Mother        ROS:   Review of Systems  Constitutional: No weight gain, No fever, No chills, No fatigue.   Eyes: No blurring, No visual disturbances.   Ear/Nose/Mouth/Throat: No decreased hearing, No ear pain, No nasal congestion, No sore throat.   Respiratory: No shortness of breath, No cough, No wheezing.   Cardiovascular: No chest pain, No palpitations, No peripheral edema.   Gastrointestinal: No nausea, No vomiting, No diarrhea, No constipation, No abdominal pain.   Genitourinary: No dysuria, No hematuria.   Hematology/Lymphatics: No bruising tendency, No bleeding tendency, No swollen lymph glands.   Endocrine: No excessive thirst, No polyuria, No excessive hunger.   Musculoskeletal: No joint pain, No muscle pain, No decreased range of motion.   Integumentary: No rash, No pruritus.   Neurologic: No abnormal balance, No confusion, No headache.   Psychiatric: No anxiety, No depression, Not suicidal, No hallucinations.      Vitals/PE:   /72 (BP Location: Left arm, Patient Position: Sitting)   Pulse 81   Resp 18   Ht 6' (1.829  m)   Wt 105.7 kg (233 lb)   SpO2 97%   BMI 31.60 kg/m²   Physical Exam    General: Alert and oriented, No acute distress.   Eye: Normal conjunctiva without exudate.  HENMT: Normocephalic/AT, Normal hearing, Oral mucosa is moist and pink   Neck: No goiter visualized.   Respiratory: Lungs CTAB, Respirations are non-labored, Breath sounds are equal, Symmetrical chest wall expansion.  Cardiovascular: Normal rate, Regular rhythm, No murmur, No edema.   Gastrointestinal: Non-distended.   Genitourinary: Deferred.  Musculoskeletal: Normal ROM, Normal gait, No deformities or amputations.  Integumentary: Warm, Dry, Intact. No diaphoresis, or flushing.  Neurologic: No focal deficits, Cranial Nerves II-XII are grossly intact.   Psychiatric: Cooperative, Appropriate mood & affect, Normal judgment, Non-suicidal.    Assessment/Plan:       1. Hypercholesterolemia    2. Hypertension, unspecified type    3. Adenocarcinoma of rectum    4. Other specified diabetes mellitus without complication, without long-term current use of insulin    5. Annual physical exam      Plan:  Increase metformin to xr 500 2 tab daily  Add actos 30mg daily  Cont glyburide  Update labs 6 mo  cpmm  screeinng uptdoate     Education and counseling done face to face regarding medical conditions and plan. Contact office if new symptoms develop. Should any symptoms ever significantly worsen seek emergency medical attention/go to ER. Follow up at least yearly for wellness or sooner PRN. Nurse to call patient with any results. The patient is receptive, expresses understanding and is agreeable to plan. All questions have been answered.    No follow-ups on file.

## 2022-09-12 NOTE — PROGRESS NOTES
Subjective:       Patient ID: Ace Gomes is a 71 y.o. male.    Vitals:  height is 6' (1.829 m) and weight is 104.3 kg (230 lb). His temperature is 99.1 °F (37.3 °C). His blood pressure is 129/76 and his pulse is 75. His respiration is 16 and oxygen saturation is 96%.     Chief Complaint: Sinus Problem (Sinus pressure, congestion, HA started 09/10, mucus is clear, took home covid test yesterday, results were negative. Runny nose )    HPI:  71-year-old male with known history of hypertension, elevated cholesterol present to clinic with concerns of nasal congestion, sinus pressure, sinus headache since 3 days.  No measured fever at home.  No shortness of breath or wheezing.  No concerns of positive exposure to COVID-19.  States home COVID-19 test was negative yesterday.    ROS  :  Constitutional : _ No fever , Body aches, Chills  HENT_As per HPI  Respiratory_no wheezing, no shortness of breath  Cardiovascular_no chest pain  Gastrointestinal_ nausea,No vomiting, No diarrhea, No abdominal pain  Musculoskeletal_no joint pain, no joint swelling  Integumentary_no skin rash or abnormal lesion   Objective:      Physical Exam    General : Alert and Oriented, No apparent distress, afebrile, appears comfortable sitting on exam table, clear speech and appropriate communication  Neck - supple  HENT : Oropharynx no redness or swelling. TMs intact mild fluid no redness.   Respiratory : Bilateral equal breath sounds, nonlabored respirations  Cardiovascular : Rate, rhythm regular, normal volume pulse, no murmur  Integumentary : Warm, Dry and no rash    Assessment:       1. Acute maxillary sinusitis, recurrence not specified        Plan:     Vaporizer to keep there was moist and help draining sinuses.  Tylenol ibuprofen for headache.  Monitor the symptoms closely.  States blood sugars have been on the higher side.  Antibiotics only for signs of infection discussed in detail voiced understanding  Call or return to clinic for  any questions.  Home COVID-19 test negative  Acute maxillary sinusitis, recurrence not specified  -     cefdinir (OMNICEF) 300 MG capsule; Take 1 capsule (300 mg total) by mouth 2 (two) times daily. for 7 days  Dispense: 14 capsule; Refill: 0

## 2022-09-12 NOTE — PATIENT INSTRUCTIONS
Vaporizer to keep there was moist and help draining sinuses.  Tylenol ibuprofen for headache.  Monitor the symptoms closely.  States blood sugars have been on the higher side.  Antibiotics only for signs of infection discussed in detail voiced understanding  Call or return to clinic for any questions.  Home COVID-19 test negative

## 2022-10-24 PROBLEM — Z00.00 ANNUAL PHYSICAL EXAM: Status: RESOLVED | Noted: 2022-01-01 | Resolved: 2022-01-01

## 2022-11-22 NOTE — TELEPHONE ENCOUNTER
----- Message from Ivan Sanders LPN sent at 11/22/2022 10:37 AM CST -----  Regarding: stephie spann 11/29 @ 10:40  Are there any outstanding tasks in patient chart? Needs fasting labs    Is there documentation of outstanding tasks in patient chart? no    Has patient been to the ER, urgent care, or another physician since last visit?    Has patient done any blood work or x-rays since last visit?

## 2022-11-29 NOTE — PROGRESS NOTES
Patient ID: Ace Gomes is a 72 y.o. male.    Chief Complaint: Follow-up (4 month f/u, labs 11/28)      HPI:   Patient presents here today for above reason.     Eber is a 72-year-old gentleman presents today 6 month follow-up.  A1c came down nicely to 6.1.  Otherwise age-appropriate screening is up-to-date he is doing great he is on metformin Actos glyburide and Trulicity.  Blood pressure is under control weight is stable screening up-to-date.    The patient's Health Maintenance was reviewed and the following appears to be due at this time:   Health Maintenance Due   Topic Date Due    Hepatitis C Screening  Never done    Shingles Vaccine (1 of 2) Never done    Abdominal Aortic Aneurysm Screening  Never done    COVID-19 Vaccine (3 - Booster for Pfizer series) 04/01/2021    Pneumococcal Vaccines (Age 65+) (2 - PCV) 07/23/2022    Influenza Vaccine (1) 09/01/2022        Past Medical History:  Past Medical History:   Diagnosis Date    Adenocarcinoma of rectum     Diabetes mellitus, type 2     Hyperlipidemia     Hypertension     Personal history of colonic polyps      Past Surgical History:   Procedure Laterality Date    COLONOSCOPY  02/08/2016    LIVER SURGERY      thumb surgery Left      Review of patient's allergies indicates:  No Known Allergies  Current Outpatient Medications on File Prior to Visit   Medication Sig Dispense Refill    felodipine (PLENDIL) 5 MG 24 hr tablet TAKE ONE TABLET BY MOUTH EVERY DAY 90 tablet 3    glyBURIDE (DIABETA) 5 MG tablet TAKE ONE TABLET BY MOUTH EVERY DAY 90 tablet 3    hydroCHLOROthiazide (HYDRODIURIL) 12.5 MG Tab TAKE ONE TABLET BY MOUTH EVERY DAY 90 tablet 1    metFORMIN (GLUCOPHAGE-XR) 500 MG ER 24hr tablet Take 1 tablet (500 mg total) by mouth 2 (two) times daily with meals. 180 tablet 2    metFORMIN (GLUCOPHAGE-XR) 500 MG ER 24hr tablet Take 500 mg by mouth.      methylPREDNISolone (MEDROL DOSEPACK) 4 mg tablet use as directed 21 each 0    pioglitazone (ACTOS) 30 MG  tablet Take 1 tablet (30 mg total) by mouth once daily. 90 tablet 3    simvastatin (ZOCOR) 40 MG tablet TAKE ONE TABLET BY MOUTH EVERY DAY AT BEDTIME 90 tablet 2    TRULICITY 1.5 mg/0.5 mL pen injector INJECT ONE PEN SUB-Q ONCE WEEKLY 4 pen 3     No current facility-administered medications on file prior to visit.     Social History     Socioeconomic History    Marital status:    Tobacco Use    Smoking status: Former    Smokeless tobacco: Never   Substance and Sexual Activity    Alcohol use: Yes     Comment: Ocassionally    Drug use: Never    Sexual activity: Yes     Social Determinants of Health     Financial Resource Strain: Low Risk     Difficulty of Paying Living Expenses: Not hard at all   Food Insecurity: No Food Insecurity    Worried About Running Out of Food in the Last Year: Never true    Ran Out of Food in the Last Year: Never true   Transportation Needs: No Transportation Needs    Lack of Transportation (Medical): No    Lack of Transportation (Non-Medical): No   Stress: No Stress Concern Present    Feeling of Stress : Not at all   Housing Stability: Low Risk     Unable to Pay for Housing in the Last Year: No    Number of Places Lived in the Last Year: 1    Unstable Housing in the Last Year: No     Family History   Problem Relation Age of Onset    Lung cancer Mother     Lung cancer Father     No Known Problems Sister     No Known Problems Brother        ROS:   Comprehensive review of systems was performed and is negative except as noted above    Vitals/PE:   /72 (BP Location: Left arm, Patient Position: Sitting)   Pulse 91   Resp 18   Ht 6' (1.829 m)   Wt 108.4 kg (239 lb)   SpO2 97%   BMI 32.41 kg/m²   Physical Exam    General: Alert and oriented, No acute distress.   Eye: Normal conjunctiva without exudate.  HENMT: Normocephalic/AT, Normal hearing, Oral mucosa is moist and pink   Neck: No goiter visualized.   Respiratory: Lungs CTAB, Respirations are non-labored, Breath sounds are  equal, Symmetrical chest wall expansion.  Cardiovascular: Normal rate, Regular rhythm, No murmur, No edema.   Gastrointestinal: Non-distended.   Genitourinary: Deferred.  Musculoskeletal: Normal ROM, Normal gait, No deformities or amputations.  Integumentary: Warm, Dry, Intact. No diaphoresis, or flushing.  Neurologic: No focal deficits, Cranial Nerves II-XII are grossly intact.   Psychiatric: Cooperative, Appropriate mood & affect, Normal judgment, Non-suicidal.    Assessment/Plan:       1. Hypercholesterolemia    2. Hypertension, unspecified type    3. Adenocarcinoma of rectum    4. Other specified diabetes mellitus without complication, without long-term current use of insulin       Plan:  Cpmm  Labs wnl  Screening uptdoate  Rtc 6 mo    Education and counseling done face to face regarding medical conditions and plan. Contact office if new symptoms develop. Should any symptoms ever significantly worsen seek emergency medical attention/go to ER. Follow up at least yearly for wellness or sooner PRN. Nurse to call patient with any results. The patient is receptive, expresses understanding and is agreeable to plan. All questions have been answered.    No follow-ups on file.

## 2023-01-01 ENCOUNTER — ANESTHESIA EVENT (OUTPATIENT)
Dept: SURGERY | Facility: HOSPITAL | Age: 73
DRG: 176 | End: 2023-01-01
Payer: MEDICARE

## 2023-01-01 ENCOUNTER — HOSPITAL ENCOUNTER (INPATIENT)
Facility: HOSPITAL | Age: 73
LOS: 3 days | Discharge: HOME | DRG: 176 | End: 2023-06-05
Attending: EMERGENCY MEDICINE | Admitting: INTERNAL MEDICINE
Payer: MEDICARE

## 2023-01-01 ENCOUNTER — INFUSION (OUTPATIENT)
Dept: INFUSION THERAPY | Facility: HOSPITAL | Age: 73
End: 2023-01-01
Attending: INTERNAL MEDICINE
Payer: MEDICARE

## 2023-01-01 ENCOUNTER — OFFICE VISIT (OUTPATIENT)
Dept: INTERNAL MEDICINE | Facility: CLINIC | Age: 73
End: 2023-01-01
Payer: MEDICARE

## 2023-01-01 ENCOUNTER — TELEPHONE (OUTPATIENT)
Dept: INTERNAL MEDICINE | Facility: CLINIC | Age: 73
End: 2023-01-01

## 2023-01-01 ENCOUNTER — TELEPHONE (OUTPATIENT)
Dept: INTERNAL MEDICINE | Facility: CLINIC | Age: 73
End: 2023-01-01
Payer: MEDICARE

## 2023-01-01 ENCOUNTER — LAB VISIT (OUTPATIENT)
Dept: LAB | Facility: HOSPITAL | Age: 73
End: 2023-01-01
Attending: INTERNAL MEDICINE
Payer: MEDICARE

## 2023-01-01 ENCOUNTER — CLINICAL SUPPORT (OUTPATIENT)
Dept: HEMATOLOGY/ONCOLOGY | Facility: CLINIC | Age: 73
End: 2023-01-01
Payer: MEDICARE

## 2023-01-01 ENCOUNTER — DOCUMENTATION ONLY (OUTPATIENT)
Dept: INTERNAL MEDICINE | Facility: CLINIC | Age: 73
End: 2023-01-01
Payer: MEDICARE

## 2023-01-01 ENCOUNTER — PATIENT OUTREACH (OUTPATIENT)
Dept: ADMINISTRATIVE | Facility: CLINIC | Age: 73
End: 2023-01-01
Payer: MEDICARE

## 2023-01-01 ENCOUNTER — OFFICE VISIT (OUTPATIENT)
Dept: URGENT CARE | Facility: CLINIC | Age: 73
End: 2023-01-01
Payer: MEDICARE

## 2023-01-01 ENCOUNTER — OFFICE VISIT (OUTPATIENT)
Dept: HEMATOLOGY/ONCOLOGY | Facility: CLINIC | Age: 73
End: 2023-01-01
Payer: MEDICARE

## 2023-01-01 ENCOUNTER — ANESTHESIA (OUTPATIENT)
Dept: SURGERY | Facility: HOSPITAL | Age: 73
DRG: 176 | End: 2023-01-01
Payer: MEDICARE

## 2023-01-01 ENCOUNTER — PATIENT MESSAGE (OUTPATIENT)
Dept: INTERNAL MEDICINE | Facility: CLINIC | Age: 73
End: 2023-01-01
Payer: MEDICARE

## 2023-01-01 ENCOUNTER — HOSPITAL ENCOUNTER (EMERGENCY)
Facility: HOSPITAL | Age: 73
Discharge: HOME | End: 2023-06-10
Attending: EMERGENCY MEDICINE
Payer: MEDICARE

## 2023-01-01 ENCOUNTER — PATIENT MESSAGE (OUTPATIENT)
Dept: HEMATOLOGY/ONCOLOGY | Facility: CLINIC | Age: 73
End: 2023-01-01
Payer: MEDICARE

## 2023-01-01 VITALS
DIASTOLIC BLOOD PRESSURE: 72 MMHG | OXYGEN SATURATION: 96 % | HEART RATE: 102 BPM | WEIGHT: 234 LBS | BODY MASS INDEX: 31.69 KG/M2 | SYSTOLIC BLOOD PRESSURE: 138 MMHG | RESPIRATION RATE: 16 BRPM | HEIGHT: 72 IN

## 2023-01-01 VITALS
HEIGHT: 71 IN | BODY MASS INDEX: 30.52 KG/M2 | RESPIRATION RATE: 20 BRPM | OXYGEN SATURATION: 96 % | DIASTOLIC BLOOD PRESSURE: 66 MMHG | WEIGHT: 218 LBS | TEMPERATURE: 99 F | SYSTOLIC BLOOD PRESSURE: 133 MMHG | HEART RATE: 123 BPM

## 2023-01-01 VITALS
HEIGHT: 72 IN | WEIGHT: 236 LBS | RESPIRATION RATE: 14 BRPM | HEART RATE: 81 BPM | DIASTOLIC BLOOD PRESSURE: 76 MMHG | OXYGEN SATURATION: 98 % | BODY MASS INDEX: 31.97 KG/M2 | SYSTOLIC BLOOD PRESSURE: 136 MMHG

## 2023-01-01 VITALS
WEIGHT: 223.56 LBS | BODY MASS INDEX: 31.3 KG/M2 | HEIGHT: 71 IN | TEMPERATURE: 99 F | SYSTOLIC BLOOD PRESSURE: 152 MMHG | RESPIRATION RATE: 18 BRPM | DIASTOLIC BLOOD PRESSURE: 78 MMHG | HEART RATE: 90 BPM | OXYGEN SATURATION: 93 %

## 2023-01-01 VITALS
HEART RATE: 102 BPM | WEIGHT: 216 LBS | DIASTOLIC BLOOD PRESSURE: 58 MMHG | SYSTOLIC BLOOD PRESSURE: 147 MMHG | TEMPERATURE: 99 F | OXYGEN SATURATION: 98 % | RESPIRATION RATE: 18 BRPM | BODY MASS INDEX: 30.24 KG/M2 | HEIGHT: 71 IN

## 2023-01-01 VITALS
WEIGHT: 222 LBS | TEMPERATURE: 99 F | DIASTOLIC BLOOD PRESSURE: 86 MMHG | BODY MASS INDEX: 30.96 KG/M2 | OXYGEN SATURATION: 95 % | SYSTOLIC BLOOD PRESSURE: 147 MMHG | HEART RATE: 102 BPM | RESPIRATION RATE: 21 BRPM

## 2023-01-01 VITALS
HEIGHT: 71 IN | RESPIRATION RATE: 14 BRPM | HEART RATE: 110 BPM | BODY MASS INDEX: 30.87 KG/M2 | DIASTOLIC BLOOD PRESSURE: 71 MMHG | TEMPERATURE: 98 F | OXYGEN SATURATION: 96 % | WEIGHT: 220.5 LBS | WEIGHT: 218 LBS | HEIGHT: 71 IN | SYSTOLIC BLOOD PRESSURE: 147 MMHG | HEART RATE: 123 BPM | BODY MASS INDEX: 30.52 KG/M2 | RESPIRATION RATE: 20 BRPM | SYSTOLIC BLOOD PRESSURE: 118 MMHG | DIASTOLIC BLOOD PRESSURE: 68 MMHG

## 2023-01-01 VITALS
RESPIRATION RATE: 16 BRPM | TEMPERATURE: 100 F | SYSTOLIC BLOOD PRESSURE: 138 MMHG | BODY MASS INDEX: 31.78 KG/M2 | DIASTOLIC BLOOD PRESSURE: 72 MMHG | WEIGHT: 227 LBS | HEIGHT: 71 IN | HEART RATE: 113 BPM | OXYGEN SATURATION: 98 %

## 2023-01-01 VITALS
HEART RATE: 77 BPM | SYSTOLIC BLOOD PRESSURE: 118 MMHG | DIASTOLIC BLOOD PRESSURE: 70 MMHG | OXYGEN SATURATION: 97 % | BODY MASS INDEX: 32.19 KG/M2 | WEIGHT: 237.63 LBS | HEIGHT: 72 IN | RESPIRATION RATE: 14 BRPM

## 2023-01-01 VITALS
WEIGHT: 234 LBS | BODY MASS INDEX: 31.69 KG/M2 | OXYGEN SATURATION: 96 % | SYSTOLIC BLOOD PRESSURE: 140 MMHG | DIASTOLIC BLOOD PRESSURE: 70 MMHG | HEIGHT: 72 IN | HEART RATE: 105 BPM | RESPIRATION RATE: 16 BRPM | TEMPERATURE: 99 F

## 2023-01-01 VITALS
HEART RATE: 130 BPM | TEMPERATURE: 99 F | WEIGHT: 218.88 LBS | DIASTOLIC BLOOD PRESSURE: 71 MMHG | OXYGEN SATURATION: 97 % | BODY MASS INDEX: 30.64 KG/M2 | SYSTOLIC BLOOD PRESSURE: 128 MMHG | HEIGHT: 71 IN | RESPIRATION RATE: 18 BRPM

## 2023-01-01 DIAGNOSIS — I82.453: ICD-10-CM

## 2023-01-01 DIAGNOSIS — C25.8 OVERLAPPING MALIGNANT NEOPLASM OF PANCREAS: Primary | ICD-10-CM

## 2023-01-01 DIAGNOSIS — R30.0 DYSURIA: ICD-10-CM

## 2023-01-01 DIAGNOSIS — R79.9 ABNORMAL FINDING OF BLOOD CHEMISTRY, UNSPECIFIED: ICD-10-CM

## 2023-01-01 DIAGNOSIS — Z12.11 SCREENING FOR MALIGNANT NEOPLASM OF COLON: ICD-10-CM

## 2023-01-01 DIAGNOSIS — Z01.818 PREOP TESTING: ICD-10-CM

## 2023-01-01 DIAGNOSIS — R63.0 DECREASED APPETITE: ICD-10-CM

## 2023-01-01 DIAGNOSIS — I26.99 PULMONARY EMBOLISM, UNSPECIFIED CHRONICITY, UNSPECIFIED PULMONARY EMBOLISM TYPE, UNSPECIFIED WHETHER ACUTE COR PULMONALE PRESENT: ICD-10-CM

## 2023-01-01 DIAGNOSIS — K59.09 OTHER CONSTIPATION: ICD-10-CM

## 2023-01-01 DIAGNOSIS — R33.9 URINARY RETENTION: ICD-10-CM

## 2023-01-01 DIAGNOSIS — E13.9 OTHER SPECIFIED DIABETES MELLITUS WITHOUT COMPLICATION, WITHOUT LONG-TERM CURRENT USE OF INSULIN: ICD-10-CM

## 2023-01-01 DIAGNOSIS — D50.8 OTHER IRON DEFICIENCY ANEMIA: ICD-10-CM

## 2023-01-01 DIAGNOSIS — I26.99 PULMONARY EMBOLISM: ICD-10-CM

## 2023-01-01 DIAGNOSIS — R05.1 ACUTE COUGH: Primary | ICD-10-CM

## 2023-01-01 DIAGNOSIS — R53.83 OTHER FATIGUE: ICD-10-CM

## 2023-01-01 DIAGNOSIS — R31.9 HEMATURIA, UNSPECIFIED TYPE: ICD-10-CM

## 2023-01-01 DIAGNOSIS — C78.7 SECONDARY MALIGNANT NEOPLASM OF LIVER: ICD-10-CM

## 2023-01-01 DIAGNOSIS — K21.9 GASTROESOPHAGEAL REFLUX DISEASE, UNSPECIFIED WHETHER ESOPHAGITIS PRESENT: ICD-10-CM

## 2023-01-01 DIAGNOSIS — Z12.11 ENCOUNTER FOR SCREENING COLONOSCOPY: Primary | ICD-10-CM

## 2023-01-01 DIAGNOSIS — D64.9 ANEMIA, UNSPECIFIED TYPE: ICD-10-CM

## 2023-01-01 DIAGNOSIS — R53.83 OTHER FATIGUE: Primary | ICD-10-CM

## 2023-01-01 DIAGNOSIS — C25.9 MALIGNANT NEOPLASM OF PANCREAS, UNSPECIFIED LOCATION OF MALIGNANCY: ICD-10-CM

## 2023-01-01 DIAGNOSIS — R50.9 FEVER, UNSPECIFIED FEVER CAUSE: ICD-10-CM

## 2023-01-01 DIAGNOSIS — C20 ADENOCARCINOMA OF RECTUM: ICD-10-CM

## 2023-01-01 DIAGNOSIS — I26.99 OTHER ACUTE PULMONARY EMBOLISM WITHOUT ACUTE COR PULMONALE: ICD-10-CM

## 2023-01-01 DIAGNOSIS — R00.0 TACHYCARDIA: ICD-10-CM

## 2023-01-01 DIAGNOSIS — Z01.818 PRE-OP EXAMINATION: Primary | ICD-10-CM

## 2023-01-01 DIAGNOSIS — K86.89 PANCREATIC MASS: Primary | ICD-10-CM

## 2023-01-01 DIAGNOSIS — Z01.818 PRE-OP EVALUATION: Primary | ICD-10-CM

## 2023-01-01 DIAGNOSIS — R30.0 DYSURIA: Primary | ICD-10-CM

## 2023-01-01 DIAGNOSIS — R63.4 WEIGHT LOSS: ICD-10-CM

## 2023-01-01 DIAGNOSIS — R35.0 FREQUENT URINATION: Primary | ICD-10-CM

## 2023-01-01 DIAGNOSIS — C25.9 PANCREATIC CANCER METASTASIZED TO LIVER: ICD-10-CM

## 2023-01-01 DIAGNOSIS — E11.65 TYPE 2 DIABETES MELLITUS WITH HYPERGLYCEMIA, WITHOUT LONG-TERM CURRENT USE OF INSULIN: ICD-10-CM

## 2023-01-01 DIAGNOSIS — R53.83 FATIGUE, UNSPECIFIED TYPE: Primary | ICD-10-CM

## 2023-01-01 DIAGNOSIS — I26.94 MULTIPLE SUBSEGMENTAL PULMONARY EMBOLI WITHOUT ACUTE COR PULMONALE: Primary | ICD-10-CM

## 2023-01-01 DIAGNOSIS — I10 HYPERTENSION, UNSPECIFIED TYPE: Primary | ICD-10-CM

## 2023-01-01 DIAGNOSIS — R50.9 FEVER, UNSPECIFIED FEVER CAUSE: Primary | ICD-10-CM

## 2023-01-01 DIAGNOSIS — H26.9 CATARACT, UNSPECIFIED CATARACT TYPE, UNSPECIFIED LATERALITY: ICD-10-CM

## 2023-01-01 DIAGNOSIS — M54.2 NECK PAIN: ICD-10-CM

## 2023-01-01 DIAGNOSIS — R05.3 CHRONIC COUGH: ICD-10-CM

## 2023-01-01 DIAGNOSIS — R06.02 EXERTIONAL SHORTNESS OF BREATH: ICD-10-CM

## 2023-01-01 DIAGNOSIS — R50.81 FEVER IN OTHER DISEASES: ICD-10-CM

## 2023-01-01 DIAGNOSIS — M79.89 RIGHT LEG SWELLING: ICD-10-CM

## 2023-01-01 DIAGNOSIS — C20 MALIGNANT NEOPLASM OF RECTUM: Primary | ICD-10-CM

## 2023-01-01 DIAGNOSIS — R30.0 BURNING WITH URINATION: Primary | ICD-10-CM

## 2023-01-01 DIAGNOSIS — R07.1 CHEST PAIN ON BREATHING: ICD-10-CM

## 2023-01-01 DIAGNOSIS — R16.0 LIVER MASSES: ICD-10-CM

## 2023-01-01 DIAGNOSIS — R60.0 LOWER EXTREMITY EDEMA: ICD-10-CM

## 2023-01-01 DIAGNOSIS — I82.453 ACUTE DEEP VEIN THROMBOSIS (DVT) OF BOTH PERONEAL VEINS: Primary | ICD-10-CM

## 2023-01-01 DIAGNOSIS — K21.9 GASTROESOPHAGEAL REFLUX DISEASE WITHOUT ESOPHAGITIS: ICD-10-CM

## 2023-01-01 DIAGNOSIS — R11.0 NAUSEA: ICD-10-CM

## 2023-01-01 DIAGNOSIS — C25.9 PANCREATIC CANCER METASTASIZED TO LIVER: Primary | ICD-10-CM

## 2023-01-01 DIAGNOSIS — C78.7 PANCREATIC CANCER METASTASIZED TO LIVER: ICD-10-CM

## 2023-01-01 DIAGNOSIS — C78.7 PANCREATIC CANCER METASTASIZED TO LIVER: Primary | ICD-10-CM

## 2023-01-01 DIAGNOSIS — R93.5 ABNORMAL CT OF THE ABDOMEN: ICD-10-CM

## 2023-01-01 DIAGNOSIS — K76.9 LESION OF LIVER: ICD-10-CM

## 2023-01-01 DIAGNOSIS — Z01.818 PREOP TESTING: Primary | ICD-10-CM

## 2023-01-01 DIAGNOSIS — F41.1 GAD (GENERALIZED ANXIETY DISORDER): Primary | ICD-10-CM

## 2023-01-01 LAB
ALBUMIN SERPL-MCNC: 2.5 G/DL (ref 3.4–4.8)
ALBUMIN SERPL-MCNC: 2.5 G/DL (ref 3.4–4.8)
ALBUMIN SERPL-MCNC: 2.7 G/DL (ref 3.4–4.8)
ALBUMIN SERPL-MCNC: 2.7 G/DL (ref 3.4–4.8)
ALBUMIN SERPL-MCNC: 2.8 G/DL (ref 3.4–4.8)
ALBUMIN SERPL-MCNC: 3.2 G/DL (ref 3.4–4.8)
ALBUMIN/GLOB SERPL: 0.5 RATIO (ref 1.1–2)
ALBUMIN/GLOB SERPL: 0.6 RATIO (ref 1.1–2)
ALBUMIN/GLOB SERPL: 0.7 RATIO (ref 1.1–2)
ALBUMIN/GLOB SERPL: 0.8 RATIO (ref 1.1–2)
ALP SERPL-CCNC: 77 UNIT/L (ref 40–150)
ALP SERPL-CCNC: 77 UNIT/L (ref 40–150)
ALP SERPL-CCNC: 78 UNIT/L (ref 40–150)
ALP SERPL-CCNC: 84 UNIT/L (ref 40–150)
ALP SERPL-CCNC: 92 UNIT/L (ref 40–150)
ALP SERPL-CCNC: 97 UNIT/L (ref 40–150)
ALT SERPL-CCNC: 10 UNIT/L (ref 0–55)
ALT SERPL-CCNC: 15 UNIT/L (ref 0–55)
ALT SERPL-CCNC: 17 UNIT/L (ref 0–55)
ALT SERPL-CCNC: 21 UNIT/L (ref 0–55)
ANION GAP SERPL CALC-SCNC: 13 MEQ/L
ANION GAP SERPL CALC-SCNC: 13 MEQ/L
ANTINUCLEAR ANTIBODY SCREEN (OHS): NEGATIVE
APPEARANCE UR: CLEAR
APTT PPP: 30.4 SECONDS (ref 23.2–33.7)
AST SERPL-CCNC: 13 UNIT/L (ref 5–34)
AST SERPL-CCNC: 17 UNIT/L (ref 5–34)
AST SERPL-CCNC: 18 UNIT/L (ref 5–34)
AST SERPL-CCNC: 19 UNIT/L (ref 5–34)
AST SERPL-CCNC: 21 UNIT/L (ref 5–34)
AST SERPL-CCNC: 21 UNIT/L (ref 5–34)
AV INDEX (PROSTH): 0.63
AV MEAN GRADIENT: 5 MMHG
AV PEAK GRADIENT: 8 MMHG
AV VALVE AREA: 1.98 CM2
AV VELOCITY RATIO: 0.66
BACTERIA #/AREA URNS AUTO: ABNORMAL /HPF
BACTERIA #/AREA URNS AUTO: ABNORMAL /HPF
BACTERIA #/AREA URNS AUTO: NORMAL /HPF
BACTERIA #/AREA URNS AUTO: NORMAL /HPF
BACTERIA BLD CULT: NORMAL
BACTERIA UR CULT: ABNORMAL
BASOPHILS # BLD AUTO: 0.03 X10(3)/MCL
BASOPHILS # BLD AUTO: 0.04 X10(3)/MCL
BASOPHILS # BLD AUTO: 0.04 X10(3)/MCL (ref 0–0.2)
BASOPHILS # BLD AUTO: 0.05 X10(3)/MCL
BASOPHILS # BLD AUTO: 0.06 X10(3)/MCL
BASOPHILS NFR BLD AUTO: 0.3 %
BASOPHILS NFR BLD AUTO: 0.4 %
BASOPHILS NFR BLD AUTO: 0.5 %
BASOPHILS NFR BLD AUTO: 0.5 %
BASOPHILS NFR BLD AUTO: 0.6 %
BASOPHILS NFR BLD AUTO: 0.7 %
BILIRUB UR QL STRIP.AUTO: NEGATIVE MG/DL
BILIRUB UR QL STRIP: POSITIVE
BILIRUBIN DIRECT+TOT PNL SERPL-MCNC: 0.7 MG/DL
BILIRUBIN DIRECT+TOT PNL SERPL-MCNC: 0.8 MG/DL
BNP BLD-MCNC: 24.9 PG/ML
BSA FOR ECHO PROCEDURE: 2.26 M2
BUN SERPL-MCNC: 10.3 MG/DL (ref 8.4–25.7)
BUN SERPL-MCNC: 13.7 MG/DL (ref 8.4–25.7)
BUN SERPL-MCNC: 14.9 MG/DL (ref 8.4–25.7)
BUN SERPL-MCNC: 16.1 MG/DL (ref 8.4–25.7)
BUN SERPL-MCNC: 16.7 MG/DL (ref 8.4–25.7)
BUN SERPL-MCNC: 9.8 MG/DL (ref 8.4–25.7)
BUN SERPL-MCNC: 9.9 MG/DL (ref 8.4–25.7)
BUN SERPL-MCNC: 9.9 MG/DL (ref 8.4–25.7)
C4 SERPL-MCNC: 43.3 MG/DL (ref 13–46)
CALCIUM SERPL-MCNC: 8.6 MG/DL (ref 8.8–10)
CALCIUM SERPL-MCNC: 8.8 MG/DL (ref 8.8–10)
CALCIUM SERPL-MCNC: 8.8 MG/DL (ref 8.8–10)
CALCIUM SERPL-MCNC: 8.9 MG/DL (ref 8.8–10)
CALCIUM SERPL-MCNC: 9.2 MG/DL (ref 8.8–10)
CALCIUM SERPL-MCNC: 9.3 MG/DL (ref 8.8–10)
CALCIUM SERPL-MCNC: 9.7 MG/DL (ref 8.8–10)
CALCIUM SERPL-MCNC: 9.8 MG/DL (ref 8.8–10)
CHLORIDE SERPL-SCNC: 102 MMOL/L (ref 98–107)
CHLORIDE SERPL-SCNC: 91 MMOL/L (ref 98–107)
CHLORIDE SERPL-SCNC: 93 MMOL/L (ref 98–107)
CHLORIDE SERPL-SCNC: 95 MMOL/L (ref 98–107)
CHLORIDE SERPL-SCNC: 95 MMOL/L (ref 98–107)
CHLORIDE SERPL-SCNC: 96 MMOL/L (ref 98–107)
CHLORIDE SERPL-SCNC: 96 MMOL/L (ref 98–107)
CHLORIDE SERPL-SCNC: 97 MMOL/L (ref 98–107)
CO2 SERPL-SCNC: 24 MMOL/L (ref 23–31)
CO2 SERPL-SCNC: 24 MMOL/L (ref 23–31)
CO2 SERPL-SCNC: 26 MMOL/L (ref 23–31)
CO2 SERPL-SCNC: 26 MMOL/L (ref 23–31)
CO2 SERPL-SCNC: 27 MMOL/L (ref 23–31)
CO2 SERPL-SCNC: 28 MMOL/L (ref 23–31)
CO2 SERPL-SCNC: 29 MMOL/L (ref 23–31)
CO2 SERPL-SCNC: 31 MMOL/L (ref 23–31)
COLOR UR AUTO: YELLOW
COLOR UR: YELLOW
CREAT SERPL-MCNC: 0.74 MG/DL (ref 0.73–1.18)
CREAT SERPL-MCNC: 0.81 MG/DL (ref 0.73–1.18)
CREAT SERPL-MCNC: 0.81 MG/DL (ref 0.73–1.18)
CREAT SERPL-MCNC: 0.83 MG/DL (ref 0.73–1.18)
CREAT SERPL-MCNC: 0.84 MG/DL (ref 0.73–1.18)
CREAT SERPL-MCNC: 0.88 MG/DL (ref 0.73–1.18)
CREAT SERPL-MCNC: 0.94 MG/DL (ref 0.73–1.18)
CREAT SERPL-MCNC: 0.96 MG/DL (ref 0.73–1.18)
CREAT/UREA NIT SERPL: 17
CREAT/UREA NIT SERPL: 21
CRP SERPL-MCNC: 178.4 MG/L
CV ECHO LV RWT: 0.47 CM
DOP CALC AO PEAK VEL: 1.43 M/S
DOP CALC AO VTI: 23 CM
DOP CALC LVOT AREA: 3.1 CM2
DOP CALC LVOT DIAMETER: 2 CM
DOP CALC LVOT PEAK VEL: 0.94 M/S
DOP CALC LVOT STROKE VOLUME: 45.53 CM3
DOP CALC MV VTI: 21.2 CM
DOP CALCLVOT PEAK VEL VTI: 14.5 CM
DSDNA AB QUANT (OHS): 0.6 IU/ML
E WAVE DECELERATION TIME: 132 MSEC
E/A RATIO: 0.78
E/E' RATIO: 10.71 M/S
ECHO LV POSTERIOR WALL: 1.26 CM (ref 0.6–1.1)
EJECTION FRACTION: 55 %
EKG 12-LEAD: NORMAL
EOSINOPHIL # BLD AUTO: 0.02 X10(3)/MCL (ref 0–0.9)
EOSINOPHIL # BLD AUTO: 0.03 X10(3)/MCL (ref 0–0.9)
EOSINOPHIL # BLD AUTO: 0.03 X10(3)/MCL (ref 0–0.9)
EOSINOPHIL # BLD AUTO: 0.05 X10(3)/MCL (ref 0–0.9)
EOSINOPHIL # BLD AUTO: 0.07 X10(3)/MCL (ref 0–0.9)
EOSINOPHIL # BLD AUTO: 0.09 X10(3)/MCL (ref 0–0.9)
EOSINOPHIL # BLD AUTO: 0.1 X10(3)/MCL (ref 0–0.9)
EOSINOPHIL # BLD AUTO: 0.1 X10(3)/MCL (ref 0–0.9)
EOSINOPHIL # BLD AUTO: 0.11 X10(3)/MCL (ref 0–0.9)
EOSINOPHIL NFR BLD AUTO: 0.1 %
EOSINOPHIL NFR BLD AUTO: 0.2 %
EOSINOPHIL NFR BLD AUTO: 0.3 %
EOSINOPHIL NFR BLD AUTO: 0.6 %
EOSINOPHIL NFR BLD AUTO: 0.7 %
EOSINOPHIL NFR BLD AUTO: 0.8 %
EOSINOPHIL NFR BLD AUTO: 0.9 %
EOSINOPHIL NFR BLD AUTO: 0.9 %
EOSINOPHIL NFR BLD AUTO: 1.1 %
ERYTHROCYTE [DISTWIDTH] IN BLOOD BY AUTOMATED COUNT: 11.7 % (ref 11.5–17)
ERYTHROCYTE [DISTWIDTH] IN BLOOD BY AUTOMATED COUNT: 11.9 % (ref 11.5–17)
ERYTHROCYTE [DISTWIDTH] IN BLOOD BY AUTOMATED COUNT: 12.2 % (ref 11.5–17)
ERYTHROCYTE [DISTWIDTH] IN BLOOD BY AUTOMATED COUNT: 12.5 % (ref 11.5–17)
ERYTHROCYTE [DISTWIDTH] IN BLOOD BY AUTOMATED COUNT: 13.5 % (ref 11.5–17)
ERYTHROCYTE [DISTWIDTH] IN BLOOD BY AUTOMATED COUNT: 13.6 % (ref 11.5–17)
ERYTHROCYTE [DISTWIDTH] IN BLOOD BY AUTOMATED COUNT: 13.7 % (ref 11.5–17)
ERYTHROCYTE [DISTWIDTH] IN BLOOD BY AUTOMATED COUNT: 13.9 % (ref 11.5–17)
ERYTHROCYTE [DISTWIDTH] IN BLOOD BY AUTOMATED COUNT: 14.4 % (ref 11.5–17)
ERYTHROCYTE [SEDIMENTATION RATE] IN BLOOD: 57 MM/HR (ref 0–15)
EST. AVERAGE GLUCOSE BLD GHB EST-MCNC: 145.6 MG/DL
FERRITIN SERPL-MCNC: 1143.2 NG/ML (ref 21.81–274.66)
FOLATE SERPL-MCNC: 8.1 NG/ML (ref 7–31.4)
FRACTIONAL SHORTENING: 26 % (ref 28–44)
GFR SERPLBLD CREATININE-BSD FMLA CKD-EPI: >60 MLS/MIN/1.73/M2
GLOBULIN SER-MCNC: 3.9 GM/DL (ref 2.4–3.5)
GLOBULIN SER-MCNC: 4.1 GM/DL (ref 2.4–3.5)
GLOBULIN SER-MCNC: 4.2 GM/DL (ref 2.4–3.5)
GLOBULIN SER-MCNC: 4.2 GM/DL (ref 2.4–3.5)
GLOBULIN SER-MCNC: 4.3 GM/DL (ref 2.4–3.5)
GLOBULIN SER-MCNC: 5.4 GM/DL (ref 2.4–3.5)
GLUCOSE SERPL-MCNC: 126 MG/DL (ref 82–115)
GLUCOSE SERPL-MCNC: 159 MG/DL (ref 82–115)
GLUCOSE SERPL-MCNC: 176 MG/DL (ref 82–115)
GLUCOSE SERPL-MCNC: 179 MG/DL (ref 82–115)
GLUCOSE SERPL-MCNC: 188 MG/DL (ref 82–115)
GLUCOSE SERPL-MCNC: 212 MG/DL (ref 82–115)
GLUCOSE SERPL-MCNC: 252 MG/DL (ref 70–110)
GLUCOSE SERPL-MCNC: 255 MG/DL (ref 82–115)
GLUCOSE SERPL-MCNC: 273 MG/DL (ref 82–115)
GLUCOSE UR QL STRIP.AUTO: ABNORMAL MG/DL
GLUCOSE UR QL STRIP.AUTO: NEGATIVE MG/DL
GLUCOSE UR QL STRIP: POSITIVE
HBA1C MFR BLD: 6.7 %
HCT VFR BLD AUTO: 26.3 % (ref 42–52)
HCT VFR BLD AUTO: 26.9 % (ref 42–52)
HCT VFR BLD AUTO: 28 % (ref 42–52)
HCT VFR BLD AUTO: 29 % (ref 42–52)
HCT VFR BLD AUTO: 29.3 % (ref 42–52)
HCT VFR BLD AUTO: 35 % (ref 42–52)
HCT VFR BLD AUTO: 36.8 % (ref 42–52)
HCT VFR BLD AUTO: 38.5 % (ref 42–52)
HCT VFR BLD AUTO: 43.2 % (ref 42–52)
HGB BLD-MCNC: 11.4 G/DL (ref 14–18)
HGB BLD-MCNC: 12.4 G/DL (ref 14–18)
HGB BLD-MCNC: 12.9 G/DL (ref 14–18)
HGB BLD-MCNC: 14.8 G/DL (ref 14–18)
HGB BLD-MCNC: 8.5 G/DL (ref 14–18)
HGB BLD-MCNC: 8.7 G/DL (ref 14–18)
HGB BLD-MCNC: 9.2 G/DL (ref 14–18)
HGB BLD-MCNC: 9.3 G/DL (ref 14–18)
HGB BLD-MCNC: 9.6 G/DL (ref 14–18)
IMM GRANULOCYTES # BLD AUTO: 0.02 X10(3)/MCL (ref 0–0.04)
IMM GRANULOCYTES # BLD AUTO: 0.08 X10(3)/MCL (ref 0–0.04)
IMM GRANULOCYTES # BLD AUTO: 0.08 X10(3)/MCL (ref 0–0.04)
IMM GRANULOCYTES # BLD AUTO: 0.11 X10(3)/MCL (ref 0–0.04)
IMM GRANULOCYTES # BLD AUTO: 0.14 X10(3)/MCL (ref 0–0.04)
IMM GRANULOCYTES # BLD AUTO: 0.16 X10(3)/MCL (ref 0–0.04)
IMM GRANULOCYTES # BLD AUTO: 0.23 X10(3)/MCL (ref 0–0.04)
IMM GRANULOCYTES NFR BLD AUTO: 0.4 %
IMM GRANULOCYTES NFR BLD AUTO: 0.7 %
IMM GRANULOCYTES NFR BLD AUTO: 0.8 %
IMM GRANULOCYTES NFR BLD AUTO: 0.9 %
IMM GRANULOCYTES NFR BLD AUTO: 1 %
IMM GRANULOCYTES NFR BLD AUTO: 1 %
IMM GRANULOCYTES NFR BLD AUTO: 1.2 %
IMM GRANULOCYTES NFR BLD AUTO: 1.4 %
IMM GRANULOCYTES NFR BLD AUTO: 1.6 %
INR BLD: 1.28 (ref 0–1.3)
INTERVENTRICULAR SEPTUM: 1.28 CM (ref 0.6–1.1)
IRON SATN MFR SERPL: 13 % (ref 20–50)
IRON SERPL-MCNC: 28 UG/DL (ref 65–175)
KETONES UR QL STRIP.AUTO: ABNORMAL MG/DL
KETONES UR QL STRIP.AUTO: NEGATIVE MG/DL
KETONES UR QL STRIP: NEGATIVE
LACTATE SERPL-SCNC: 1.1 MMOL/L (ref 0.5–2.2)
LEFT ATRIUM SIZE: 4 CM
LEFT INTERNAL DIMENSION IN SYSTOLE: 3.96 CM (ref 2.1–4)
LEFT VENTRICLE DIASTOLIC VOLUME INDEX: 62.9 ML/M2
LEFT VENTRICLE DIASTOLIC VOLUME: 139 ML
LEFT VENTRICLE MASS INDEX: 128 G/M2
LEFT VENTRICLE SYSTOLIC VOLUME INDEX: 30.9 ML/M2
LEFT VENTRICLE SYSTOLIC VOLUME: 68.3 ML
LEFT VENTRICULAR INTERNAL DIMENSION IN DIASTOLE: 5.36 CM (ref 3.5–6)
LEFT VENTRICULAR MASS: 282.69 G
LEUKOCYTE ESTERASE UR QL STRIP.AUTO: ABNORMAL UNIT/L
LEUKOCYTE ESTERASE UR QL STRIP.AUTO: ABNORMAL UNIT/L
LEUKOCYTE ESTERASE UR QL STRIP.AUTO: NEGATIVE UNIT/L
LEUKOCYTE ESTERASE UR QL STRIP.AUTO: NEGATIVE UNIT/L
LEUKOCYTE ESTERASE UR QL STRIP: NEGATIVE
LV LATERAL E/E' RATIO: 10.11 M/S
LV SEPTAL E/E' RATIO: 11.38 M/S
LVOT MG: 2 MMHG
LVOT MV: 0.63 CM/S
LYMPHOCYTES # BLD AUTO: 1.21 X10(3)/MCL (ref 0.6–4.6)
LYMPHOCYTES # BLD AUTO: 1.26 X10(3)/MCL (ref 0.6–4.6)
LYMPHOCYTES # BLD AUTO: 1.28 X10(3)/MCL (ref 0.6–4.6)
LYMPHOCYTES # BLD AUTO: 1.31 X10(3)/MCL (ref 0.6–4.6)
LYMPHOCYTES # BLD AUTO: 1.36 X10(3)/MCL (ref 0.6–4.6)
LYMPHOCYTES # BLD AUTO: 1.38 X10(3)/MCL (ref 0.6–4.6)
LYMPHOCYTES # BLD AUTO: 1.46 X10(3)/MCL (ref 0.6–4.6)
LYMPHOCYTES # BLD AUTO: 1.48 X10(3)/MCL (ref 0.6–4.6)
LYMPHOCYTES # BLD AUTO: 1.96 X10(3)/MCL (ref 0.6–4.6)
LYMPHOCYTES NFR BLD AUTO: 10 %
LYMPHOCYTES NFR BLD AUTO: 11.2 %
LYMPHOCYTES NFR BLD AUTO: 12.3 %
LYMPHOCYTES NFR BLD AUTO: 12.6 %
LYMPHOCYTES NFR BLD AUTO: 12.6 %
LYMPHOCYTES NFR BLD AUTO: 13.2 %
LYMPHOCYTES NFR BLD AUTO: 13.8 %
LYMPHOCYTES NFR BLD AUTO: 22.2 %
LYMPHOCYTES NFR BLD AUTO: 8.4 %
MCH RBC QN AUTO: 29 PG (ref 27–31)
MCH RBC QN AUTO: 29.2 PG (ref 27–31)
MCH RBC QN AUTO: 29.3 PG (ref 27–31)
MCH RBC QN AUTO: 29.5 PG (ref 27–31)
MCH RBC QN AUTO: 29.7 PG (ref 27–31)
MCH RBC QN AUTO: 31.4 PG (ref 27–31)
MCH RBC QN AUTO: 32.1 PG (ref 27–31)
MCH RBC QN AUTO: 32.3 PG (ref 27–31)
MCH RBC QN AUTO: 33.5 PG
MCHC RBC AUTO-ENTMCNC: 31.7 G/DL (ref 33–36)
MCHC RBC AUTO-ENTMCNC: 32.3 G/DL (ref 33–36)
MCHC RBC AUTO-ENTMCNC: 32.3 G/DL (ref 33–36)
MCHC RBC AUTO-ENTMCNC: 32.6 G/DL (ref 33–36)
MCHC RBC AUTO-ENTMCNC: 32.9 G/DL (ref 33–36)
MCHC RBC AUTO-ENTMCNC: 33.1 G/DL (ref 33–36)
MCHC RBC AUTO-ENTMCNC: 33.5 G/DL (ref 33–36)
MCHC RBC AUTO-ENTMCNC: 33.7 G/DL (ref 33–36)
MCHC RBC AUTO-ENTMCNC: 34.3 G/DL (ref 33–36)
MCV RBC AUTO: 88.3 FL (ref 80–94)
MCV RBC AUTO: 89.8 FL (ref 80–94)
MCV RBC AUTO: 90.4 FL (ref 80–94)
MCV RBC AUTO: 90.6 FL (ref 80–94)
MCV RBC AUTO: 93 FL (ref 80–94)
MCV RBC AUTO: 95.3 FL (ref 80–94)
MCV RBC AUTO: 96.3 FL (ref 80–94)
MCV RBC AUTO: 96.4 FL (ref 80–94)
MCV RBC AUTO: 97.7 FL (ref 80–94)
MONOCYTES # BLD AUTO: 0.55 X10(3)/MCL (ref 0.1–1.3)
MONOCYTES # BLD AUTO: 1.01 X10(3)/MCL (ref 0.1–1.3)
MONOCYTES # BLD AUTO: 1.11 X10(3)/MCL (ref 0.1–1.3)
MONOCYTES # BLD AUTO: 1.13 X10(3)/MCL (ref 0.1–1.3)
MONOCYTES # BLD AUTO: 1.25 X10(3)/MCL (ref 0.1–1.3)
MONOCYTES # BLD AUTO: 1.28 X10(3)/MCL (ref 0.1–1.3)
MONOCYTES # BLD AUTO: 1.31 X10(3)/MCL (ref 0.1–1.3)
MONOCYTES # BLD AUTO: 1.33 X10(3)/MCL (ref 0.1–1.3)
MONOCYTES # BLD AUTO: 1.42 X10(3)/MCL (ref 0.1–1.3)
MONOCYTES NFR BLD AUTO: 10 %
MONOCYTES NFR BLD AUTO: 10.3 %
MONOCYTES NFR BLD AUTO: 10.6 %
MONOCYTES NFR BLD AUTO: 10.9 %
MONOCYTES NFR BLD AUTO: 11.3 %
MONOCYTES NFR BLD AUTO: 8.3 %
MONOCYTES NFR BLD AUTO: 9.6 %
MONOCYTES NFR BLD AUTO: 9.7 %
MONOCYTES NFR BLD AUTO: 9.8 %
MV MEAN GRADIENT: 3 MMHG
MV PEAK A VEL: 1.17 M/S
MV PEAK E VEL: 0.91 M/S
MV PEAK GRADIENT: 5 MMHG
MV STENOSIS PRESSURE HALF TIME: 46 MS
MV VALVE AREA BY CONTINUITY EQUATION: 2.15 CM2
MV VALVE AREA P 1/2 METHOD: 4.78 CM2
NEUTROPHILS # BLD AUTO: 10.42 X10(3)/MCL (ref 2.1–9.2)
NEUTROPHILS # BLD AUTO: 10.87 X10(3)/MCL (ref 2.1–9.2)
NEUTROPHILS # BLD AUTO: 12.42 X10(3)/MCL (ref 2.1–9.2)
NEUTROPHILS # BLD AUTO: 3.75 X10(3)/MCL (ref 2.1–9.2)
NEUTROPHILS # BLD AUTO: 7.68 X10(3)/MCL (ref 2.1–9.2)
NEUTROPHILS # BLD AUTO: 8.01 X10(3)/MCL (ref 2.1–9.2)
NEUTROPHILS # BLD AUTO: 8.21 X10(3)/MCL (ref 2.1–9.2)
NEUTROPHILS # BLD AUTO: 8.59 X10(3)/MCL (ref 2.1–9.2)
NEUTROPHILS # BLD AUTO: 8.73 X10(3)/MCL (ref 2.1–9.2)
NEUTROPHILS NFR BLD AUTO: 66.1 %
NEUTROPHILS NFR BLD AUTO: 73.5 %
NEUTROPHILS NFR BLD AUTO: 74.1 %
NEUTROPHILS NFR BLD AUTO: 74.4 %
NEUTROPHILS NFR BLD AUTO: 74.6 %
NEUTROPHILS NFR BLD AUTO: 75 %
NEUTROPHILS NFR BLD AUTO: 76.1 %
NEUTROPHILS NFR BLD AUTO: 78.9 %
NEUTROPHILS NFR BLD AUTO: 82 %
NITRITE UR QL STRIP.AUTO: NEGATIVE
NRBC BLD AUTO-RTO: 0 %
OHS LV EJECTION FRACTION SIMPSONS BIPLANE MOD: 5 %
PH UR STRIP.AUTO: 5.5 [PH]
PH UR STRIP.AUTO: 5.5 [PH]
PH UR STRIP.AUTO: 6 [PH]
PH UR STRIP.AUTO: 6.5 [PH]
PH, POC UA: 5
PLATELET # BLD AUTO: 201 X10(3)/MCL (ref 130–400)
PLATELET # BLD AUTO: 292 X10(3)/MCL (ref 130–400)
PLATELET # BLD AUTO: 299 X10(3)/MCL (ref 130–400)
PLATELET # BLD AUTO: 302 X10(3)/MCL (ref 130–400)
PLATELET # BLD AUTO: 310 X10(3)/MCL (ref 130–400)
PLATELET # BLD AUTO: 320 X10(3)/MCL (ref 130–400)
PLATELET # BLD AUTO: 328 X10(3)/MCL (ref 130–400)
PLATELET # BLD AUTO: 365 X10(3)/MCL (ref 130–400)
PLATELET # BLD AUTO: 381 X10(3)/MCL (ref 130–400)
PMV BLD AUTO: 8.7 FL (ref 7.4–10.4)
PMV BLD AUTO: 8.7 FL (ref 7.4–10.4)
PMV BLD AUTO: 8.8 FL (ref 7.4–10.4)
PMV BLD AUTO: 8.8 FL (ref 7.4–10.4)
PMV BLD AUTO: 8.9 FL (ref 7.4–10.4)
PMV BLD AUTO: 9 FL (ref 7.4–10.4)
PMV BLD AUTO: 9.2 FL (ref 7.4–10.4)
POC BLOOD, URINE: NEGATIVE
POC NITRATES, URINE: NEGATIVE
POCT GLUCOSE: 223 MG/DL (ref 70–110)
POCT GLUCOSE: 244 MG/DL (ref 70–110)
POCT GLUCOSE: 251 MG/DL (ref 70–110)
POCT GLUCOSE: 253 MG/DL (ref 70–110)
POCT GLUCOSE: 271 MG/DL (ref 70–110)
POCT GLUCOSE: 277 MG/DL (ref 70–110)
POCT GLUCOSE: 294 MG/DL (ref 70–110)
POCT GLUCOSE: 304 MG/DL (ref 70–110)
POCT GLUCOSE: 343 MG/DL (ref 70–110)
POCT GLUCOSE: 404 MG/DL (ref 70–110)
POTASSIUM SERPL-SCNC: 3.2 MMOL/L (ref 3.5–5.1)
POTASSIUM SERPL-SCNC: 3.3 MMOL/L (ref 3.5–5.1)
POTASSIUM SERPL-SCNC: 3.4 MMOL/L (ref 3.5–5.1)
POTASSIUM SERPL-SCNC: 3.4 MMOL/L (ref 3.5–5.1)
POTASSIUM SERPL-SCNC: 3.6 MMOL/L (ref 3.5–5.1)
POTASSIUM SERPL-SCNC: 4.2 MMOL/L (ref 3.5–5.1)
POTASSIUM SERPL-SCNC: 4.2 MMOL/L (ref 3.5–5.1)
POTASSIUM SERPL-SCNC: 4.4 MMOL/L (ref 3.5–5.1)
PR INTERVAL: NORMAL
PROT SERPL-MCNC: 6.4 GM/DL (ref 5.8–7.6)
PROT SERPL-MCNC: 6.8 GM/DL (ref 5.8–7.6)
PROT SERPL-MCNC: 6.9 GM/DL (ref 5.8–7.6)
PROT SERPL-MCNC: 7 GM/DL (ref 5.8–7.6)
PROT SERPL-MCNC: 7.3 GM/DL (ref 5.8–7.6)
PROT SERPL-MCNC: 8.1 GM/DL (ref 5.8–7.6)
PROT UR QL STRIP.AUTO: ABNORMAL MG/DL
PROT UR QL STRIP.AUTO: ABNORMAL MG/DL
PROT UR QL STRIP.AUTO: NEGATIVE MG/DL
PROT UR QL STRIP.AUTO: NEGATIVE MG/DL
PROT UR QL STRIP: POSITIVE
PROTHROMBIN TIME: 15.9 SECONDS (ref 12.5–14.5)
PRT AXES: NORMAL
PV PEAK VELOCITY: 1.21 CM/S
QRS DURATION: NORMAL
QT/QTC: NORMAL
RBC # BLD AUTO: 2.91 X10(6)/MCL (ref 4.7–6.1)
RBC # BLD AUTO: 2.97 X10(6)/MCL (ref 4.7–6.1)
RBC # BLD AUTO: 3.15 X10(6)/MCL (ref 4.7–6.1)
RBC # BLD AUTO: 3.17 X10(6)/MCL (ref 4.7–6.1)
RBC # BLD AUTO: 3.23 X10(6)/MCL (ref 4.7–6.1)
RBC # BLD AUTO: 3.63 X10(6)/MCL (ref 4.7–6.1)
RBC # BLD AUTO: 3.86 X10(6)/MCL (ref 4.7–6.1)
RBC # BLD AUTO: 4 X10(6)/MCL (ref 4.7–6.1)
RBC # BLD AUTO: 4.42 X10(6)/MCL (ref 4.7–6.1)
RBC #/AREA URNS AUTO: <5 /HPF
RBC #/AREA URNS AUTO: ABNORMAL /HPF
RBC UR QL AUTO: NEGATIVE UNIT/L
RET# (OHS): 0.06 (ref 0.03–0.1)
RETICULOCYTE COUNT AUTOMATED (OLG): 1.63 % (ref 1.1–2.1)
SODIUM SERPL-SCNC: 130 MMOL/L (ref 136–145)
SODIUM SERPL-SCNC: 133 MMOL/L (ref 136–145)
SODIUM SERPL-SCNC: 135 MMOL/L (ref 136–145)
SODIUM SERPL-SCNC: 139 MMOL/L (ref 136–145)
SODIUM SERPL-SCNC: 139 MMOL/L (ref 136–145)
SODIUM SERPL-SCNC: 142 MMOL/L (ref 136–145)
SP GR UR STRIP.AUTO: 1.01 (ref 1–1.03)
SP GR UR STRIP.AUTO: 1.02 (ref 1–1.03)
SP GR UR STRIP: 1.01 (ref 1–1.03)
SQUAMOUS #/AREA URNS AUTO: <5 /HPF
SQUAMOUS #/AREA URNS AUTO: ABNORMAL /HPF
TDI LATERAL: 0.09 M/S
TDI SEPTAL: 0.08 M/S
TDI: 0.09 M/S
TIBC SERPL-MCNC: 194 UG/DL (ref 69–240)
TIBC SERPL-MCNC: 222 UG/DL (ref 250–450)
TRANSFERRIN SERPL-MCNC: 195 MG/DL (ref 163–344)
TRICUSPID ANNULAR PLANE SYSTOLIC EXCURSION: 1.92 CM
TROPONIN I SERPL-MCNC: <0.01 NG/ML (ref 0–0.04)
TSH SERPL-ACNC: 1.55 UIU/ML (ref 0.35–4.94)
URATE SERPL-MCNC: 4.9 MG/DL (ref 3.5–7.2)
UROBILINOGEN UR STRIP-ACNC: 1 MG/DL
UROBILINOGEN UR STRIP-ACNC: POSITIVE (ref 0.3–2.2)
VENTRICULAR RATE: NORMAL
VIT B12 SERPL-MCNC: 1277 PG/ML (ref 213–816)
WBC # SPEC AUTO: 10.29 X10(3)/MCL (ref 4.5–11.5)
WBC # SPEC AUTO: 10.68 X10(3)/MCL (ref 4.5–11.5)
WBC # SPEC AUTO: 10.79 X10(3)/MCL (ref 4.5–11.5)
WBC # SPEC AUTO: 11.59 X10(3)/MCL (ref 4.5–11.5)
WBC # SPEC AUTO: 11.73 X10(3)/MCL (ref 4.5–11.5)
WBC # SPEC AUTO: 13.79 X10(3)/MCL (ref 4.5–11.5)
WBC # SPEC AUTO: 14.18 X10(3)/MCL (ref 4.5–11.5)
WBC # SPEC AUTO: 15.15 X10(3)/MCL (ref 4.5–11.5)
WBC # SPEC AUTO: 5.7 X10(3)/MCL (ref 4.5–11.5)
WBC #/AREA URNS AUTO: 7 /HPF
WBC #/AREA URNS AUTO: <5 /HPF
WBC #/AREA URNS AUTO: <5 /HPF
WBC #/AREA URNS AUTO: ABNORMAL /HPF

## 2023-01-01 PROCEDURE — 83550 IRON BINDING TEST: CPT | Performed by: NURSE PRACTITIONER

## 2023-01-01 PROCEDURE — 88312 SPECIAL STAINS GROUP 1: CPT

## 2023-01-01 PROCEDURE — 99215 PR OFFICE/OUTPT VISIT, EST, LEVL V, 40-54 MIN: ICD-10-PCS | Mod: S$PBB,,, | Performed by: INTERNAL MEDICINE

## 2023-01-01 PROCEDURE — 85025 COMPLETE CBC W/AUTO DIFF WBC: CPT | Performed by: EMERGENCY MEDICINE

## 2023-01-01 PROCEDURE — 87040 BLOOD CULTURE FOR BACTERIA: CPT | Performed by: NURSE PRACTITIONER

## 2023-01-01 PROCEDURE — 85025 COMPLETE CBC W/AUTO DIFF WBC: CPT

## 2023-01-01 PROCEDURE — 99233 SBSQ HOSP IP/OBS HIGH 50: CPT | Mod: ,,, | Performed by: INTERNAL MEDICINE

## 2023-01-01 PROCEDURE — 99223 PR INITIAL HOSPITAL CARE,LEVL III: ICD-10-PCS | Mod: AI,,, | Performed by: INTERNAL MEDICINE

## 2023-01-01 PROCEDURE — 99999 PR PBB SHADOW E&M-EST. PATIENT-LVL IV: CPT | Mod: PBBFAC,,, | Performed by: NURSE PRACTITIONER

## 2023-01-01 PROCEDURE — 25000003 PHARM REV CODE 250: Performed by: EMERGENCY MEDICINE

## 2023-01-01 PROCEDURE — 85025 COMPLETE CBC W/AUTO DIFF WBC: CPT | Performed by: INTERNAL MEDICINE

## 2023-01-01 PROCEDURE — 99215 OFFICE O/P EST HI 40 MIN: CPT | Mod: PBBFAC | Performed by: INTERNAL MEDICINE

## 2023-01-01 PROCEDURE — 80048 BASIC METABOLIC PNL TOTAL CA: CPT | Performed by: NURSE PRACTITIONER

## 2023-01-01 PROCEDURE — 99214 OFFICE O/P EST MOD 30 MIN: CPT | Mod: ,,, | Performed by: NURSE PRACTITIONER

## 2023-01-01 PROCEDURE — 83036 HEMOGLOBIN GLYCOSYLATED A1C: CPT | Performed by: NURSE PRACTITIONER

## 2023-01-01 PROCEDURE — 96413 CHEMO IV INFUSION 1 HR: CPT

## 2023-01-01 PROCEDURE — 85730 THROMBOPLASTIN TIME PARTIAL: CPT | Performed by: EMERGENCY MEDICINE

## 2023-01-01 PROCEDURE — 25000003 PHARM REV CODE 250: Performed by: INTERNAL MEDICINE

## 2023-01-01 PROCEDURE — 11000001 HC ACUTE MED/SURG PRIVATE ROOM

## 2023-01-01 PROCEDURE — 63600175 PHARM REV CODE 636 W HCPCS: Performed by: INTERNAL MEDICINE

## 2023-01-01 PROCEDURE — 99999 PR PBB SHADOW E&M-EST. PATIENT-LVL V: ICD-10-PCS | Mod: PBBFAC,,, | Performed by: INTERNAL MEDICINE

## 2023-01-01 PROCEDURE — 99233 PR SUBSEQUENT HOSPITAL CARE,LEVL III: ICD-10-PCS | Mod: ,,, | Performed by: INTERNAL MEDICINE

## 2023-01-01 PROCEDURE — 81001 URINALYSIS AUTO W/SCOPE: CPT | Performed by: NURSE PRACTITIONER

## 2023-01-01 PROCEDURE — 63600175 PHARM REV CODE 636 W HCPCS: Performed by: NURSE ANESTHETIST, CERTIFIED REGISTERED

## 2023-01-01 PROCEDURE — 93005 ELECTROCARDIOGRAM TRACING: CPT

## 2023-01-01 PROCEDURE — 88313 SPECIAL STAINS GROUP 2: CPT

## 2023-01-01 PROCEDURE — 80053 COMPREHEN METABOLIC PANEL: CPT | Performed by: NURSE PRACTITIONER

## 2023-01-01 PROCEDURE — 83880 ASSAY OF NATRIURETIC PEPTIDE: CPT | Performed by: EMERGENCY MEDICINE

## 2023-01-01 PROCEDURE — 85045 AUTOMATED RETICULOCYTE COUNT: CPT | Performed by: NURSE PRACTITIONER

## 2023-01-01 PROCEDURE — 99215 OFFICE O/P EST HI 40 MIN: CPT | Mod: S$PBB,,, | Performed by: NURSE PRACTITIONER

## 2023-01-01 PROCEDURE — 80053 COMPREHEN METABOLIC PANEL: CPT | Performed by: INTERNAL MEDICINE

## 2023-01-01 PROCEDURE — 99239 PR HOSPITAL DISCHARGE DAY,>30 MIN: ICD-10-PCS | Mod: ,,, | Performed by: INTERNAL MEDICINE

## 2023-01-01 PROCEDURE — 88342 IMHCHEM/IMCYTCHM 1ST ANTB: CPT

## 2023-01-01 PROCEDURE — 81001 URINALYSIS AUTO W/SCOPE: CPT

## 2023-01-01 PROCEDURE — 80048 BASIC METABOLIC PNL TOTAL CA: CPT

## 2023-01-01 PROCEDURE — 99215 OFFICE O/P EST HI 40 MIN: CPT | Mod: S$PBB,,, | Performed by: INTERNAL MEDICINE

## 2023-01-01 PROCEDURE — D9220A PRA ANESTHESIA: Mod: CRNA,,, | Performed by: NURSE ANESTHETIST, CERTIFIED REGISTERED

## 2023-01-01 PROCEDURE — 88173 CYTOPATH EVAL FNA REPORT: CPT

## 2023-01-01 PROCEDURE — 99999 PR PBB SHADOW E&M-EST. PATIENT-LVL V: CPT | Mod: PBBFAC,,, | Performed by: INTERNAL MEDICINE

## 2023-01-01 PROCEDURE — 87040 BLOOD CULTURE FOR BACTERIA: CPT

## 2023-01-01 PROCEDURE — 99232 PR SUBSEQUENT HOSPITAL CARE,LEVL II: ICD-10-PCS | Mod: ,,, | Performed by: INTERNAL MEDICINE

## 2023-01-01 PROCEDURE — 82962 GLUCOSE BLOOD TEST: CPT

## 2023-01-01 PROCEDURE — 25000003 PHARM REV CODE 250: Performed by: NURSE ANESTHETIST, CERTIFIED REGISTERED

## 2023-01-01 PROCEDURE — 99214 PR OFFICE/OUTPT VISIT, EST, LEVL IV, 30-39 MIN: ICD-10-PCS | Mod: ,,, | Performed by: NURSE PRACTITIONER

## 2023-01-01 PROCEDURE — 85610 PROTHROMBIN TIME: CPT | Performed by: EMERGENCY MEDICINE

## 2023-01-01 PROCEDURE — 82962 POCT GLUCOSE, HAND-HELD DEVICE: ICD-10-PCS | Mod: ,,, | Performed by: NURSE PRACTITIONER

## 2023-01-01 PROCEDURE — 36415 COLL VENOUS BLD VENIPUNCTURE: CPT

## 2023-01-01 PROCEDURE — 63600175 PHARM REV CODE 636 W HCPCS: Performed by: EMERGENCY MEDICINE

## 2023-01-01 PROCEDURE — 83605 ASSAY OF LACTIC ACID: CPT | Performed by: NURSE PRACTITIONER

## 2023-01-01 PROCEDURE — 99239 HOSP IP/OBS DSCHRG MGMT >30: CPT | Mod: ,,, | Performed by: INTERNAL MEDICINE

## 2023-01-01 PROCEDURE — 99215 PR OFFICE/OUTPT VISIT, EST, LEVL V, 40-54 MIN: ICD-10-PCS | Mod: S$PBB,,, | Performed by: NURSE PRACTITIONER

## 2023-01-01 PROCEDURE — 84484 ASSAY OF TROPONIN QUANT: CPT | Performed by: EMERGENCY MEDICINE

## 2023-01-01 PROCEDURE — 93010 EKG 12-LEAD: ICD-10-PCS | Mod: ,,, | Performed by: INTERNAL MEDICINE

## 2023-01-01 PROCEDURE — 99203 PR OFFICE/OUTPT VISIT, NEW, LEVL III, 30-44 MIN: ICD-10-PCS | Mod: ,,, | Performed by: NURSE PRACTITIONER

## 2023-01-01 PROCEDURE — 85651 RBC SED RATE NONAUTOMATED: CPT | Performed by: NURSE PRACTITIONER

## 2023-01-01 PROCEDURE — 82728 ASSAY OF FERRITIN: CPT | Performed by: NURSE PRACTITIONER

## 2023-01-01 PROCEDURE — 86140 C-REACTIVE PROTEIN: CPT | Performed by: NURSE PRACTITIONER

## 2023-01-01 PROCEDURE — 85025 COMPLETE CBC W/AUTO DIFF WBC: CPT | Performed by: NURSE PRACTITIONER

## 2023-01-01 PROCEDURE — 96372 THER/PROPH/DIAG INJ SC/IM: CPT | Performed by: EMERGENCY MEDICINE

## 2023-01-01 PROCEDURE — 80053 COMPREHEN METABOLIC PANEL: CPT | Performed by: EMERGENCY MEDICINE

## 2023-01-01 PROCEDURE — 88305 TISSUE EXAM BY PATHOLOGIST: CPT

## 2023-01-01 PROCEDURE — 81003 URINALYSIS AUTO W/O SCOPE: CPT | Mod: QW,,, | Performed by: NURSE PRACTITIONER

## 2023-01-01 PROCEDURE — A4216 STERILE WATER/SALINE, 10 ML: HCPCS | Performed by: INTERNAL MEDICINE

## 2023-01-01 PROCEDURE — 99499 NO LOS: ICD-10-PCS | Mod: ,,, | Performed by: NURSE PRACTITIONER

## 2023-01-01 PROCEDURE — 99203 OFFICE O/P NEW LOW 30 MIN: CPT | Mod: ,,, | Performed by: NURSE PRACTITIONER

## 2023-01-01 PROCEDURE — 21400001 HC TELEMETRY ROOM

## 2023-01-01 PROCEDURE — 84550 ASSAY OF BLOOD/URIC ACID: CPT | Performed by: NURSE PRACTITIONER

## 2023-01-01 PROCEDURE — 63600175 PHARM REV CODE 636 W HCPCS: Mod: JZ,JG | Performed by: INTERNAL MEDICINE

## 2023-01-01 PROCEDURE — 81003 POCT URINALYSIS, DIPSTICK, MANUAL, W/O SCOPE: ICD-10-PCS | Mod: QW,,, | Performed by: NURSE PRACTITIONER

## 2023-01-01 PROCEDURE — 86160 COMPLEMENT ANTIGEN: CPT | Performed by: NURSE PRACTITIONER

## 2023-01-01 PROCEDURE — 27000221 HC OXYGEN, UP TO 24 HOURS

## 2023-01-01 PROCEDURE — 82746 ASSAY OF FOLIC ACID SERUM: CPT | Performed by: NURSE PRACTITIONER

## 2023-01-01 PROCEDURE — 96417 CHEMO IV INFUS EACH ADDL SEQ: CPT

## 2023-01-01 PROCEDURE — 99499 UNLISTED E&M SERVICE: CPT | Mod: ,,, | Performed by: NURSE PRACTITIONER

## 2023-01-01 PROCEDURE — 99232 SBSQ HOSP IP/OBS MODERATE 35: CPT | Mod: ,,, | Performed by: INTERNAL MEDICINE

## 2023-01-01 PROCEDURE — D9220A PRA ANESTHESIA: ICD-10-PCS | Mod: ANES,,, | Performed by: ANESTHESIOLOGY

## 2023-01-01 PROCEDURE — 87040 BLOOD CULTURE FOR BACTERIA: CPT | Performed by: EMERGENCY MEDICINE

## 2023-01-01 PROCEDURE — D9220A PRA ANESTHESIA: Mod: ANES,,, | Performed by: ANESTHESIOLOGY

## 2023-01-01 PROCEDURE — 86225 DNA ANTIBODY NATIVE: CPT

## 2023-01-01 PROCEDURE — 82962 GLUCOSE BLOOD TEST: CPT | Mod: ,,, | Performed by: NURSE PRACTITIONER

## 2023-01-01 PROCEDURE — D9220A PRA ANESTHESIA: ICD-10-PCS | Mod: CRNA,,, | Performed by: NURSE ANESTHETIST, CERTIFIED REGISTERED

## 2023-01-01 PROCEDURE — 36415 COLL VENOUS BLD VENIPUNCTURE: CPT | Performed by: NURSE PRACTITIONER

## 2023-01-01 PROCEDURE — 96376 TX/PRO/DX INJ SAME DRUG ADON: CPT

## 2023-01-01 PROCEDURE — 99214 OFFICE O/P EST MOD 30 MIN: CPT | Mod: PBBFAC | Performed by: NURSE PRACTITIONER

## 2023-01-01 PROCEDURE — 99223 1ST HOSP IP/OBS HIGH 75: CPT | Mod: AI,,, | Performed by: INTERNAL MEDICINE

## 2023-01-01 PROCEDURE — 99284 EMERGENCY DEPT VISIT MOD MDM: CPT | Mod: 25

## 2023-01-01 PROCEDURE — 88341 IMHCHEM/IMCYTCHM EA ADD ANTB: CPT

## 2023-01-01 PROCEDURE — 82607 VITAMIN B-12: CPT | Performed by: NURSE PRACTITIONER

## 2023-01-01 PROCEDURE — 99291 CRITICAL CARE FIRST HOUR: CPT

## 2023-01-01 PROCEDURE — 93010 ELECTROCARDIOGRAM REPORT: CPT | Mod: ,,, | Performed by: INTERNAL MEDICINE

## 2023-01-01 PROCEDURE — 99999 PR PBB SHADOW E&M-EST. PATIENT-LVL IV: ICD-10-PCS | Mod: PBBFAC,,, | Performed by: NURSE PRACTITIONER

## 2023-01-01 PROCEDURE — 84443 ASSAY THYROID STIM HORMONE: CPT | Performed by: NURSE PRACTITIONER

## 2023-01-01 RX ORDER — MELOXICAM 15 MG/1
15 TABLET ORAL
COMMUNITY
Start: 2023-01-01

## 2023-01-01 RX ORDER — SODIUM CHLORIDE 0.9 % (FLUSH) 0.9 %
10 SYRINGE (ML) INJECTION
Status: DISCONTINUED | OUTPATIENT
Start: 2023-01-01 | End: 2023-01-01 | Stop reason: HOSPADM

## 2023-01-01 RX ORDER — ONDANSETRON 2 MG/ML
4 INJECTION INTRAMUSCULAR; INTRAVENOUS EVERY 8 HOURS PRN
Status: DISCONTINUED | OUTPATIENT
Start: 2023-01-01 | End: 2023-01-01 | Stop reason: HOSPADM

## 2023-01-01 RX ORDER — DEXAMETHASONE SODIUM PHOSPHATE 4 MG/ML
INJECTION, SOLUTION INTRA-ARTICULAR; INTRALESIONAL; INTRAMUSCULAR; INTRAVENOUS; SOFT TISSUE
Status: DISCONTINUED | OUTPATIENT
Start: 2023-01-01 | End: 2023-01-01

## 2023-01-01 RX ORDER — MORPHINE SULFATE 4 MG/ML
4 INJECTION, SOLUTION INTRAMUSCULAR; INTRAVENOUS EVERY 4 HOURS PRN
Status: DISCONTINUED | OUTPATIENT
Start: 2023-01-01 | End: 2023-01-01 | Stop reason: HOSPADM

## 2023-01-01 RX ORDER — ENOXAPARIN SODIUM 100 MG/ML
1 INJECTION SUBCUTANEOUS ONCE
Status: DISCONTINUED | OUTPATIENT
Start: 2023-01-01 | End: 2023-01-01

## 2023-01-01 RX ORDER — ALPRAZOLAM 0.5 MG/1
0.5 TABLET ORAL NIGHTLY PRN
Qty: 30 TABLET | Refills: 0 | Status: SHIPPED | OUTPATIENT
Start: 2023-01-01 | End: 2023-01-01

## 2023-01-01 RX ORDER — FENTANYL CITRATE 50 UG/ML
INJECTION, SOLUTION INTRAMUSCULAR; INTRAVENOUS
Status: DISCONTINUED | OUTPATIENT
Start: 2023-01-01 | End: 2023-01-01

## 2023-01-01 RX ORDER — TALC
6 POWDER (GRAM) TOPICAL NIGHTLY PRN
Status: DISCONTINUED | OUTPATIENT
Start: 2023-01-01 | End: 2023-01-01 | Stop reason: HOSPADM

## 2023-01-01 RX ORDER — PANCRELIPASE LIPASE, PANCRELIPASE PROTEASE, PANCRELIPASE AMYLASE 40000; 126000; 168000 [USP'U]/1; [USP'U]/1; [USP'U]/1
CAPSULE, DELAYED RELEASE ORAL
COMMUNITY
Start: 2023-01-01

## 2023-01-01 RX ORDER — MOXIFLOXACIN 5 MG/ML
SOLUTION/ DROPS OPHTHALMIC
COMMUNITY
Start: 2023-01-01

## 2023-01-01 RX ORDER — CEPHALEXIN 500 MG/1
500 CAPSULE ORAL EVERY 6 HOURS
Qty: 40 CAPSULE | Refills: 0 | Status: SHIPPED | OUTPATIENT
Start: 2023-01-01 | End: 2023-01-01

## 2023-01-01 RX ORDER — HEPARIN SODIUM,PORCINE/D5W 25000/250
0-40 INTRAVENOUS SOLUTION INTRAVENOUS CONTINUOUS
Status: DISCONTINUED | OUTPATIENT
Start: 2023-01-01 | End: 2023-01-01

## 2023-01-01 RX ORDER — FAMOTIDINE 40 MG/1
40 TABLET, FILM COATED ORAL DAILY
COMMUNITY
Start: 2023-01-01

## 2023-01-01 RX ORDER — SIMVASTATIN 40 MG/1
40 TABLET, FILM COATED ORAL NIGHTLY
COMMUNITY
Start: 2023-01-01

## 2023-01-01 RX ORDER — CIPROFLOXACIN 500 MG/1
500 TABLET ORAL 2 TIMES DAILY
Qty: 20 TABLET | Refills: 0 | Status: SHIPPED | OUTPATIENT
Start: 2023-01-01 | End: 2023-01-01

## 2023-01-01 RX ORDER — POLYETHYLENE GLYCOL 3350 17 G/17G
17 POWDER, FOR SOLUTION ORAL DAILY
Status: DISCONTINUED | OUTPATIENT
Start: 2023-01-01 | End: 2023-01-01 | Stop reason: HOSPADM

## 2023-01-01 RX ORDER — ALPRAZOLAM 0.5 MG/1
0.5 TABLET ORAL NIGHTLY PRN
Status: DISCONTINUED | OUTPATIENT
Start: 2023-01-01 | End: 2023-01-01

## 2023-01-01 RX ORDER — IBUPROFEN 200 MG
16 TABLET ORAL
Status: DISCONTINUED | OUTPATIENT
Start: 2023-01-01 | End: 2023-01-01 | Stop reason: HOSPADM

## 2023-01-01 RX ORDER — CLINDAMYCIN PHOSPHATE 11.9 MG/ML
SOLUTION TOPICAL 2 TIMES DAILY
COMMUNITY
Start: 2023-01-01

## 2023-01-01 RX ORDER — HYDROCHLOROTHIAZIDE 12.5 MG/1
TABLET ORAL
Qty: 90 TABLET | Refills: 1 | Status: SHIPPED | OUTPATIENT
Start: 2023-01-01

## 2023-01-01 RX ORDER — GLYBURIDE 5 MG/1
10 TABLET ORAL
Status: DISCONTINUED | OUTPATIENT
Start: 2023-01-01 | End: 2023-01-01 | Stop reason: HOSPADM

## 2023-01-01 RX ORDER — PROPOFOL 10 MG/ML
VIAL (ML) INTRAVENOUS CONTINUOUS PRN
Status: DISCONTINUED | OUTPATIENT
Start: 2023-01-01 | End: 2023-01-01

## 2023-01-01 RX ORDER — ONDANSETRON 4 MG/1
8 TABLET, ORALLY DISINTEGRATING ORAL EVERY 8 HOURS PRN
Status: DISCONTINUED | OUTPATIENT
Start: 2023-01-01 | End: 2023-01-01 | Stop reason: HOSPADM

## 2023-01-01 RX ORDER — ONDANSETRON 2 MG/ML
8 INJECTION INTRAMUSCULAR; INTRAVENOUS
Status: CANCELLED | OUTPATIENT
Start: 2023-01-01

## 2023-01-01 RX ORDER — PREDNISOLONE ACETATE 10 MG/ML
SUSPENSION/ DROPS OPHTHALMIC
COMMUNITY
Start: 2023-01-01

## 2023-01-01 RX ORDER — PRAVASTATIN SODIUM 40 MG/1
40 TABLET ORAL NIGHTLY
Status: DISCONTINUED | OUTPATIENT
Start: 2023-01-01 | End: 2023-01-01 | Stop reason: HOSPADM

## 2023-01-01 RX ORDER — ALPRAZOLAM 0.5 MG/1
0.5 TABLET ORAL DAILY PRN
COMMUNITY
Start: 2023-01-01

## 2023-01-01 RX ORDER — BROMFENAC SODIUM 0.7 MG/ML
SOLUTION/ DROPS OPHTHALMIC
COMMUNITY
Start: 2023-01-01

## 2023-01-01 RX ORDER — ZOLPIDEM TARTRATE 5 MG/1
5 TABLET ORAL NIGHTLY PRN
Status: DISCONTINUED | OUTPATIENT
Start: 2023-01-01 | End: 2023-01-01 | Stop reason: HOSPADM

## 2023-01-01 RX ORDER — SODIUM CHLORIDE 0.9 % (FLUSH) 0.9 %
10 SYRINGE (ML) INJECTION
Status: CANCELLED | OUTPATIENT
Start: 2023-01-01

## 2023-01-01 RX ORDER — HEPARIN 100 UNIT/ML
500 SYRINGE INTRAVENOUS
Status: CANCELLED | OUTPATIENT
Start: 2023-01-01

## 2023-01-01 RX ORDER — ACETAMINOPHEN 325 MG/1
650 TABLET ORAL EVERY 8 HOURS PRN
Status: DISCONTINUED | OUTPATIENT
Start: 2023-01-01 | End: 2023-01-01 | Stop reason: HOSPADM

## 2023-01-01 RX ORDER — NALOXONE HCL 0.4 MG/ML
VIAL (ML) INJECTION
Status: DISCONTINUED | OUTPATIENT
Start: 2023-01-01 | End: 2023-01-01

## 2023-01-01 RX ORDER — ONDANSETRON 4 MG/1
4 TABLET, ORALLY DISINTEGRATING ORAL EVERY 8 HOURS PRN
Status: DISCONTINUED | OUTPATIENT
Start: 2023-01-01 | End: 2023-01-01 | Stop reason: HOSPADM

## 2023-01-01 RX ORDER — AZITHROMYCIN 250 MG/1
TABLET, FILM COATED ORAL
Qty: 6 TABLET | Refills: 0 | Status: SHIPPED | OUTPATIENT
Start: 2023-01-01 | End: 2023-01-01

## 2023-01-01 RX ORDER — CIPROFLOXACIN 500 MG/1
500 TABLET ORAL EVERY 12 HOURS
Qty: 20 TABLET | Refills: 0 | Status: SHIPPED | OUTPATIENT
Start: 2023-01-01 | End: 2023-01-01

## 2023-01-01 RX ORDER — DULAGLUTIDE 1.5 MG/.5ML
INJECTION, SOLUTION SUBCUTANEOUS
COMMUNITY
Start: 2023-01-01

## 2023-01-01 RX ORDER — IBUPROFEN 200 MG
24 TABLET ORAL
Status: DISCONTINUED | OUTPATIENT
Start: 2023-01-01 | End: 2023-01-01 | Stop reason: HOSPADM

## 2023-01-01 RX ORDER — LIDOCAINE HYDROCHLORIDE 20 MG/ML
INJECTION, SOLUTION EPIDURAL; INFILTRATION; INTRACAUDAL; PERINEURAL
Status: DISCONTINUED | OUTPATIENT
Start: 2023-01-01 | End: 2023-01-01

## 2023-01-01 RX ORDER — HEPARIN 100 UNIT/ML
500 SYRINGE INTRAVENOUS
Status: DISCONTINUED | OUTPATIENT
Start: 2023-01-01 | End: 2023-01-01 | Stop reason: HOSPADM

## 2023-01-01 RX ORDER — METFORMIN HYDROCHLORIDE 500 MG/1
1000 TABLET, EXTENDED RELEASE ORAL DAILY
Qty: 60 TABLET | Refills: 3 | Status: SHIPPED | OUTPATIENT
Start: 2023-01-01

## 2023-01-01 RX ORDER — HYDROCHLOROTHIAZIDE 12.5 MG/1
12.5 TABLET ORAL DAILY
COMMUNITY
Start: 2023-01-01

## 2023-01-01 RX ORDER — ONDANSETRON 4 MG/1
4 TABLET, ORALLY DISINTEGRATING ORAL EVERY 8 HOURS PRN
Qty: 1 TABLET | Refills: 0 | Status: SHIPPED | OUTPATIENT
Start: 2023-01-01 | End: 2023-01-01

## 2023-01-01 RX ORDER — FAMOTIDINE 40 MG/1
40 TABLET, FILM COATED ORAL DAILY
Qty: 90 TABLET | Refills: 3 | Status: SHIPPED | OUTPATIENT
Start: 2023-01-01 | End: 2024-06-05

## 2023-01-01 RX ORDER — PHENAZOPYRIDINE HYDROCHLORIDE 100 MG/1
100 TABLET, FILM COATED ORAL 3 TIMES DAILY PRN
Qty: 9 TABLET | Refills: 0 | Status: SHIPPED | OUTPATIENT
Start: 2023-01-01 | End: 2023-01-01

## 2023-01-01 RX ORDER — PROPOFOL 10 MG/ML
VIAL (ML) INTRAVENOUS
Status: DISCONTINUED | OUTPATIENT
Start: 2023-01-01 | End: 2023-01-01

## 2023-01-01 RX ORDER — METFORMIN HYDROCHLORIDE 500 MG/1
500 TABLET, EXTENDED RELEASE ORAL 2 TIMES DAILY
COMMUNITY
Start: 2023-01-01

## 2023-01-01 RX ORDER — INSULIN ASPART 100 [IU]/ML
0-5 INJECTION, SOLUTION INTRAVENOUS; SUBCUTANEOUS
Status: DISCONTINUED | OUTPATIENT
Start: 2023-01-01 | End: 2023-01-01

## 2023-01-01 RX ORDER — PIOGLITAZONEHYDROCHLORIDE 30 MG/1
30 TABLET ORAL DAILY
COMMUNITY
Start: 2023-01-01

## 2023-01-01 RX ORDER — APIXABAN 5 MG/1
TABLET, FILM COATED ORAL
Qty: 40 TABLET | Refills: 0 | OUTPATIENT
Start: 2023-01-01

## 2023-01-01 RX ORDER — GLYCOPYRROLATE 0.2 MG/ML
INJECTION INTRAMUSCULAR; INTRAVENOUS
Status: DISCONTINUED | OUTPATIENT
Start: 2023-01-01 | End: 2023-01-01

## 2023-01-01 RX ORDER — MORPHINE SULFATE 4 MG/ML
2 INJECTION, SOLUTION INTRAMUSCULAR; INTRAVENOUS EVERY 4 HOURS PRN
Status: DISCONTINUED | OUTPATIENT
Start: 2023-01-01 | End: 2023-01-01 | Stop reason: HOSPADM

## 2023-01-01 RX ORDER — FELODIPINE 5 MG/1
TABLET, EXTENDED RELEASE ORAL
Qty: 90 TABLET | Refills: 3 | Status: SHIPPED | OUTPATIENT
Start: 2023-01-01

## 2023-01-01 RX ORDER — GLYBURIDE 5 MG/1
10 TABLET ORAL
Qty: 180 TABLET | Refills: 3 | Status: SHIPPED | OUTPATIENT
Start: 2023-01-01

## 2023-01-01 RX ORDER — ALPRAZOLAM 0.5 MG/1
0.5 TABLET ORAL 3 TIMES DAILY PRN
Status: DISCONTINUED | OUTPATIENT
Start: 2023-01-01 | End: 2023-01-01 | Stop reason: HOSPADM

## 2023-01-01 RX ORDER — FAMOTIDINE 20 MG/1
20 TABLET, FILM COATED ORAL 2 TIMES DAILY
Status: DISCONTINUED | OUTPATIENT
Start: 2023-01-01 | End: 2023-01-01 | Stop reason: HOSPADM

## 2023-01-01 RX ORDER — INSULIN ASPART 100 [IU]/ML
1-10 INJECTION, SOLUTION INTRAVENOUS; SUBCUTANEOUS
Status: DISCONTINUED | OUTPATIENT
Start: 2023-01-01 | End: 2023-01-01 | Stop reason: HOSPADM

## 2023-01-01 RX ORDER — ONDANSETRON 2 MG/ML
8 INJECTION INTRAMUSCULAR; INTRAVENOUS
Status: COMPLETED | OUTPATIENT
Start: 2023-01-01 | End: 2023-01-01

## 2023-01-01 RX ORDER — ENOXAPARIN SODIUM 100 MG/ML
1 INJECTION SUBCUTANEOUS EVERY 12 HOURS
Status: DISCONTINUED | OUTPATIENT
Start: 2023-01-01 | End: 2023-01-01

## 2023-01-01 RX ORDER — GLUCAGON 1 MG
1 KIT INJECTION
Status: DISCONTINUED | OUTPATIENT
Start: 2023-01-01 | End: 2023-01-01 | Stop reason: HOSPADM

## 2023-01-01 RX ORDER — ONDANSETRON 2 MG/ML
INJECTION INTRAMUSCULAR; INTRAVENOUS
Status: DISCONTINUED | OUTPATIENT
Start: 2023-01-01 | End: 2023-01-01

## 2023-01-01 RX ORDER — MELOXICAM 15 MG/1
15 TABLET ORAL DAILY
COMMUNITY
Start: 2023-01-01

## 2023-01-01 RX ORDER — CIPROFLOXACIN 500 MG/1
500 TABLET ORAL EVERY 12 HOURS
Qty: 10 TABLET | Refills: 0 | Status: SHIPPED | OUTPATIENT
Start: 2023-01-01 | End: 2023-01-01

## 2023-01-01 RX ORDER — AMLODIPINE BESYLATE 2.5 MG/1
2.5 TABLET ORAL DAILY
Status: DISCONTINUED | OUTPATIENT
Start: 2023-01-01 | End: 2023-01-01 | Stop reason: HOSPADM

## 2023-01-01 RX ORDER — MELOXICAM 7.5 MG/1
15 TABLET ORAL DAILY
Status: DISCONTINUED | OUTPATIENT
Start: 2023-01-01 | End: 2023-01-01

## 2023-01-01 RX ORDER — ONDANSETRON HYDROCHLORIDE 8 MG/1
8 TABLET, FILM COATED ORAL EVERY 8 HOURS PRN
Qty: 30 TABLET | Refills: 2 | Status: SHIPPED | OUTPATIENT
Start: 2023-01-01 | End: 2024-06-13

## 2023-01-01 RX ADMIN — POLYETHYLENE GLYCOL 3350 17 G: 17 POWDER, FOR SOLUTION ORAL at 08:06

## 2023-01-01 RX ADMIN — NALOXONE HYDROCHLORIDE 0.2 MG: 0.4 INJECTION, SOLUTION INTRAMUSCULAR; INTRAVENOUS; SUBCUTANEOUS at 09:05

## 2023-01-01 RX ADMIN — AMLODIPINE BESYLATE 2.5 MG: 2.5 TABLET ORAL at 10:06

## 2023-01-01 RX ADMIN — INSULIN ASPART 3 UNITS: 100 INJECTION, SOLUTION INTRAVENOUS; SUBCUTANEOUS at 06:06

## 2023-01-01 RX ADMIN — INSULIN ASPART 8 UNITS: 100 INJECTION, SOLUTION INTRAVENOUS; SUBCUTANEOUS at 11:06

## 2023-01-01 RX ADMIN — ONDANSETRON 4 MG: 2 INJECTION INTRAMUSCULAR; INTRAVENOUS at 08:05

## 2023-01-01 RX ADMIN — ACETAMINOPHEN 650 MG: 325 TABLET ORAL at 10:06

## 2023-01-01 RX ADMIN — SODIUM CHLORIDE: 9 INJECTION, SOLUTION INTRAVENOUS at 10:06

## 2023-01-01 RX ADMIN — INSULIN ASPART 3 UNITS: 100 INJECTION, SOLUTION INTRAVENOUS; SUBCUTANEOUS at 12:06

## 2023-01-01 RX ADMIN — FAMOTIDINE 20 MG: 20 TABLET, FILM COATED ORAL at 10:06

## 2023-01-01 RX ADMIN — PRAVASTATIN SODIUM 40 MG: 40 TABLET ORAL at 08:06

## 2023-01-01 RX ADMIN — SODIUM CHLORIDE, SODIUM GLUCONATE, SODIUM ACETATE, POTASSIUM CHLORIDE AND MAGNESIUM CHLORIDE: 526; 502; 368; 37; 30 INJECTION, SOLUTION INTRAVENOUS at 08:05

## 2023-01-01 RX ADMIN — FAMOTIDINE 20 MG: 20 TABLET, FILM COATED ORAL at 09:06

## 2023-01-01 RX ADMIN — POLYETHYLENE GLYCOL 3350 17 G: 17 POWDER, FOR SOLUTION ORAL at 04:06

## 2023-01-01 RX ADMIN — INSULIN ASPART 6 UNITS: 100 INJECTION, SOLUTION INTRAVENOUS; SUBCUTANEOUS at 10:06

## 2023-01-01 RX ADMIN — Medication 6 MG: at 11:06

## 2023-01-01 RX ADMIN — FAMOTIDINE 20 MG: 20 TABLET, FILM COATED ORAL at 08:06

## 2023-01-01 RX ADMIN — PRAVASTATIN SODIUM 40 MG: 40 TABLET ORAL at 09:06

## 2023-01-01 RX ADMIN — INSULIN ASPART 6 UNITS: 100 INJECTION, SOLUTION INTRAVENOUS; SUBCUTANEOUS at 04:06

## 2023-01-01 RX ADMIN — REMIFENTANIL HYDROCHLORIDE 150 MCG: 1 INJECTION, POWDER, LYOPHILIZED, FOR SOLUTION INTRAVENOUS at 09:05

## 2023-01-01 RX ADMIN — Medication 10 ML: at 12:06

## 2023-01-01 RX ADMIN — Medication 50 MG: at 09:05

## 2023-01-01 RX ADMIN — INSULIN ASPART 4 UNITS: 100 INJECTION, SOLUTION INTRAVENOUS; SUBCUTANEOUS at 12:06

## 2023-01-01 RX ADMIN — LIDOCAINE HYDROCHLORIDE 80 MG: 20 INJECTION, SOLUTION EPIDURAL; INFILTRATION; INTRACAUDAL; PERINEURAL at 08:05

## 2023-01-01 RX ADMIN — FENTANYL CITRATE 100 MCG: 50 INJECTION, SOLUTION INTRAMUSCULAR; INTRAVENOUS at 09:05

## 2023-01-01 RX ADMIN — GLYBURIDE 10 MG: 5 TABLET ORAL at 10:06

## 2023-01-01 RX ADMIN — APIXABAN 10 MG: 5 TABLET, FILM COATED ORAL at 10:06

## 2023-01-01 RX ADMIN — Medication 50 MG: at 08:05

## 2023-01-01 RX ADMIN — REMIFENTANIL HYDROCHLORIDE 150 MCG: 1 INJECTION, POWDER, LYOPHILIZED, FOR SOLUTION INTRAVENOUS at 08:05

## 2023-01-01 RX ADMIN — GEMCITABINE 2200 MG: 38 INJECTION, POWDER, LYOPHILIZED, FOR SOLUTION INTRAVENOUS at 12:06

## 2023-01-01 RX ADMIN — AMLODIPINE BESYLATE 2.5 MG: 2.5 TABLET ORAL at 08:06

## 2023-01-01 RX ADMIN — ENOXAPARIN SODIUM 100 MG: 100 INJECTION SUBCUTANEOUS at 06:06

## 2023-01-01 RX ADMIN — DEXAMETHASONE SODIUM PHOSPHATE 4 MG: 4 INJECTION, SOLUTION INTRA-ARTICULAR; INTRALESIONAL; INTRAMUSCULAR; INTRAVENOUS; SOFT TISSUE at 08:05

## 2023-01-01 RX ADMIN — POLYETHYLENE GLYCOL 3350 17 G: 17 POWDER, FOR SOLUTION ORAL at 10:06

## 2023-01-01 RX ADMIN — ENOXAPARIN SODIUM 100 MG: 100 INJECTION SUBCUTANEOUS at 04:06

## 2023-01-01 RX ADMIN — ONDANSETRON 8 MG: 2 INJECTION INTRAMUSCULAR; INTRAVENOUS at 10:06

## 2023-01-01 RX ADMIN — ALPRAZOLAM 0.5 MG: 0.5 TABLET ORAL at 11:06

## 2023-01-01 RX ADMIN — GLYBURIDE 10 MG: 5 TABLET ORAL at 08:06

## 2023-01-01 RX ADMIN — ALPRAZOLAM 0.5 MG: 0.5 TABLET ORAL at 09:06

## 2023-01-01 RX ADMIN — Medication 100 MG: at 08:05

## 2023-01-01 RX ADMIN — ENOXAPARIN SODIUM 100 MG: 100 INJECTION SUBCUTANEOUS at 05:06

## 2023-01-01 RX ADMIN — INSULIN ASPART 5 UNITS: 100 INJECTION, SOLUTION INTRAVENOUS; SUBCUTANEOUS at 10:06

## 2023-01-01 RX ADMIN — ENOXAPARIN SODIUM 100 MG: 100 INJECTION SUBCUTANEOUS at 02:06

## 2023-01-01 RX ADMIN — INSULIN ASPART 5 UNITS: 100 INJECTION, SOLUTION INTRAVENOUS; SUBCUTANEOUS at 12:06

## 2023-01-01 RX ADMIN — PROPOFOL 150 MCG/KG/MIN: 10 INJECTION, EMULSION INTRAVENOUS at 08:05

## 2023-01-01 RX ADMIN — FENTANYL CITRATE 100 MCG: 50 INJECTION, SOLUTION INTRAMUSCULAR; INTRAVENOUS at 08:05

## 2023-01-01 RX ADMIN — APIXABAN 10 MG: 5 TABLET, FILM COATED ORAL at 04:06

## 2023-01-01 RX ADMIN — GLYCOPYRROLATE 0.2 MG: 0.2 INJECTION INTRAMUSCULAR; INTRAVENOUS at 08:05

## 2023-01-01 RX ADMIN — PACLITAXEL 280 MG: 100 INJECTION, POWDER, LYOPHILIZED, FOR SUSPENSION INTRAVENOUS at 11:06

## 2023-01-01 RX ADMIN — HEPARIN 500 UNITS: 100 SYRINGE at 12:06

## 2023-01-09 NOTE — TELEPHONE ENCOUNTER
Pt called c/o urinary frequency and burning. Placed U/A order to be completed at [facility]. Pt voiced understanding

## 2023-01-11 NOTE — TELEPHONE ENCOUNTER
Pt called stating he is still burning when he urinates and still has some clotted blood in his urine. Please advise    CB# [phone]

## 2023-02-10 NOTE — PROGRESS NOTES
Subjective:      Patient ID: Ace Gomes is a 72 y.o. male.    Chief Complaint: Hematuria (Pt has blood in urine 2 months ago and when it was checked with a UA test was clear. Pt now has burning with urination but has not seen blood. Pt has not been running a fever, no lower back or abdominal pain)      HPI: Patient here today for dysuria. He did have goldie hematuria that last 2 days about 1 month ago. Tx with Cipro by our office with resolve. Some burning at that time. He presents today with some burning. No blood noted.  Denies fever, chills, sweats, abdominal/flank pain. PSA stable at last draw. No flow issues.    Review of patient's allergies indicates:  No Known Allergies    Review of Systems  Constitutional: No fever, No chills, No sweats, No fatigue  Gastrointestinal: No nausea, No vomiting, No diarrhea, No rectal bleeding, No constipation, No abdominal pain.  Genitourinary: dysuria, No hematuria, No frequency.    Objective:   Visit Vitals  /70 (BP Location: Left arm, Patient Position: Sitting, BP Method: Large (Manual))   Pulse 77   Resp 14   Ht 6' (1.829 m)   Wt 107.8 kg (237 lb 9.6 oz)   SpO2 97%   BMI 32.22 kg/m²       Physical Exam  Vitals and nursing note reviewed.   Constitutional:       General: He is not in acute distress.     Appearance: Normal appearance. He is normal weight. He is not ill-appearing, toxic-appearing or diaphoretic.   HENT:      Head: Normocephalic and atraumatic.   Cardiovascular:      Rate and Rhythm: Normal rate and regular rhythm.      Heart sounds: Normal heart sounds.   Pulmonary:      Effort: Pulmonary effort is normal.      Breath sounds: Normal breath sounds.   Abdominal:      General: Abdomen is flat. Bowel sounds are normal.      Palpations: Abdomen is soft.      Tenderness: There is no right CVA tenderness or left CVA tenderness.   Neurological:      General: No focal deficit present.      Mental Status: He is alert and oriented to person, place, and time.  Mental status is at baseline.       Assessment/Plan:   1. Dysuria  UA today  Further eval with US abdomen due to recurrent issues  Reviewed previous UA without evidence of infection/blood.  Tx with Cipro x 10 days  Inc fluid to 64 oz/day    - Urinalysis, Reflex to Urine Culture  - US Abdomen Complete; Future    2. Hematuria, unspecified type  See above  - Urinalysis, Reflex to Urine Culture  - US Abdomen Complete; Future      Medication List with Changes/Refills   Current Medications    FELODIPINE (PLENDIL) 5 MG 24 HR TABLET    TAKE ONE TABLET BY MOUTH EVERY DAY    GLYBURIDE (DIABETA) 5 MG TABLET    TAKE ONE TABLET BY MOUTH EVERY DAY    HYDROCHLOROTHIAZIDE (HYDRODIURIL) 12.5 MG TAB    TAKE ONE TABLET BY MOUTH EVERY DAY    METFORMIN (GLUCOPHAGE-XR) 500 MG ER 24HR TABLET    Take 500 mg by mouth.    PIOGLITAZONE (ACTOS) 30 MG TABLET    Take 1 tablet (30 mg total) by mouth once daily.    SIMVASTATIN (ZOCOR) 40 MG TABLET    TAKE ONE TABLET BY MOUTH EVERY DAY AT BEDTIME    TRULICITY 1.5 MG/0.5 ML PEN INJECTOR    INJECT ONE PEN SUB-Q ONCE WEEKLY   Discontinued Medications    METFORMIN (GLUCOPHAGE-XR) 500 MG ER 24HR TABLET    Take 1 tablet (500 mg total) by mouth 2 (two) times daily with meals.    METHYLPREDNISOLONE (MEDROL DOSEPACK) 4 MG TABLET    use as directed        No follow-ups on file.    Chemistry:  Lab Results   Component Value Date     11/28/2022    K 4.3 11/28/2022    CHLORIDE 99 11/28/2022    BUN 22.0 11/28/2022    CREATININE 0.87 11/28/2022    EGFRNORACEVR >60 11/28/2022    GLUCOSE 145 (H) 11/28/2022    CALCIUM 9.6 11/28/2022    ALKPHOS 59 11/28/2022    LABPROT 6.7 11/28/2022    ALBUMIN 4.1 11/28/2022    BILIDIR 0.5 01/28/2022    IBILI 0.60 01/28/2022    AST 18 11/28/2022    ALT 14 11/28/2022        Lab Results   Component Value Date    HGBA1C 6.1 11/28/2022        Hematology:  Lab Results   Component Value Date    WBC 5.2 04/05/2022    HGB 14.3 04/05/2022    HCT 40.0 04/05/2022     04/05/2022        Lipid Panel:  Lab Results   Component Value Date    CHOL 186 07/22/2022    HDL 62 (H) 07/22/2022    LDL 92.00 07/22/2022    TRIG 158 (H) 07/22/2022    TOTALCHOLEST 3 07/22/2022        Urine:  Lab Results   Component Value Date    COLORUA Yellow 01/09/2023    APPEARANCEUA Clear 01/09/2023    SGUA 1.009 01/09/2023    PHUA 6.5 01/09/2023    PROTEINUA Negative 01/09/2023    GLUCOSEUA 2+ (A) 01/09/2023    KETONESUA Negative 01/09/2023    BLOODUA Negative 01/09/2023    NITRITESUA Negative 01/09/2023    LEUKOCYTESUR Trace (A) 01/09/2023    RBCUA <5 01/09/2023    WBCUA 7 (H) 01/09/2023    BACTERIA None Seen 01/09/2023    CREATRANDUR 174.3 (H) 11/28/2022

## 2023-02-13 NOTE — TELEPHONE ENCOUNTER
----- Message from Lanie Longoria NP sent at 2/13/2023  8:03 AM CST -----  Please let pt know that UA + for infection.  How are burning s/s? Any further s/s of infection?  Culture indicates Cipro appropriate tx.  Continue current POC and f/u as needed.

## 2023-02-23 NOTE — TELEPHONE ENCOUNTER
----- Message from Ivan Sanders LPN sent at 2/23/2023  3:38 PM CST -----  Regarding: appt  Pt needs appt for sx clearance and preop testing done prior. Sx date is 3/21/23

## 2023-03-03 PROBLEM — Z72.0 TOBACCO USER: Status: ACTIVE | Noted: 2023-01-01

## 2023-03-03 PROBLEM — E66.9 OBESITY: Status: ACTIVE | Noted: 2023-01-01

## 2023-03-03 PROBLEM — Z86.010 HISTORY OF COLONIC POLYPS: Status: ACTIVE | Noted: 2020-06-29

## 2023-03-03 NOTE — TELEPHONE ENCOUNTER
----- Message from Lanie Longoria NP sent at 3/3/2023 11:31 AM CST -----  Labs reviewed. Cleared for cataract sx. Please let patient know all wnl and fax once doc complete.

## 2023-03-03 NOTE — PROGRESS NOTES
Pre-Operative Evaluation:    Chief Complaint: Pre-op Exam (Pt is here for pre-op cataract Sx with Dr Childress for right eye, pt is doing good today)        HPI:     Ace Gomes is a 72 y.o. male here today for a surgery clearance. Plans to have cataract sx 3/21/23 with Dr. Childress. No other complaints today. Denies CP, palpitations, s/s bleeding/bruising, fever, chills, or sweats. No hx or family h/o anesthesia problems.  He reports that he has been having persistent issues with cervical pain x 6-8 months. Some R shoulder/arm radicular s/s initially, which have improved with chiropractor care. States chiro would like MRI. He has not attempted OTCs or seen PT. S/s have improved some with persistent stiffness and crepitus.  XR done through chiro noting DDD per patient.          ----------------------------  Adenocarcinoma of rectum  Diabetes mellitus, type 2  Hyperlipidemia  Hypertension  Personal history of colonic polyps     Past Surgical History:   Procedure Laterality Date    COLONOSCOPY  02/08/2016    LIVER SURGERY      thumb surgery Left        Review of patient's allergies indicates:  No Known Allergies    Outpatient Medications Marked as Taking for the 3/3/23 encounter (Office Visit) with Lanie Longoria NP   Medication Sig Dispense Refill    felodipine (PLENDIL) 5 MG 24 hr tablet TAKE ONE TABLET BY MOUTH EVERY DAY 90 tablet 3    glyBURIDE (DIABETA) 5 MG tablet TAKE ONE TABLET BY MOUTH EVERY DAY 90 tablet 3    hydroCHLOROthiazide (HYDRODIURIL) 12.5 MG Tab TAKE ONE TABLET BY MOUTH EVERY DAY 90 tablet 1    metFORMIN (GLUCOPHAGE-XR) 500 MG ER 24hr tablet Take 500 mg by mouth.      pioglitazone (ACTOS) 30 MG tablet Take 1 tablet (30 mg total) by mouth once daily. 90 tablet 3    simvastatin (ZOCOR) 40 MG tablet TAKE ONE TABLET BY MOUTH AT BEDTIME 90 tablet 2    TRULICITY 1.5 mg/0.5 mL pen injector INJECT ONE PEN SUB-Q ONCE WEEKLY 4 pen 3       Social History     Socioeconomic History    Marital status:     Tobacco Use    Smoking status: Former    Smokeless tobacco: Never   Substance and Sexual Activity    Alcohol use: Yes     Comment: Ocassionally    Drug use: Never    Sexual activity: Yes     Social Determinants of Health     Financial Resource Strain: Low Risk     Difficulty of Paying Living Expenses: Not hard at all   Food Insecurity: No Food Insecurity    Worried About Running Out of Food in the Last Year: Never true    Ran Out of Food in the Last Year: Never true   Transportation Needs: No Transportation Needs    Lack of Transportation (Medical): No    Lack of Transportation (Non-Medical): No   Stress: No Stress Concern Present    Feeling of Stress : Not at all   Housing Stability: Low Risk     Unable to Pay for Housing in the Last Year: No    Number of Places Lived in the Last Year: 1    Unstable Housing in the Last Year: No        Family History   Problem Relation Age of Onset    Lung cancer Mother     Lung cancer Father     No Known Problems Sister     No Known Problems Brother         Patient Care Team:  Jimmy Zamorano II, MD as PCP - General (Internal Medicine)  MD Tila Alston II, MD as Consulting Physician (Ophthalmology)     Subjective:     ROS    See HPI for details    Constitutional: Denies Change in appetite. Denies Chills. Denies Fever. Denies Night sweats.  Eye: Denies Blurred vision. Denies Discharge. Denies Eye pain.  ENT: Denies Decreased hearing. Denies Sore throat. Denies Swollen glands.  Respiratory: Denies Cough. Denies Shortness of breath. Denies Shortness of breath with exertion. Denies Wheezing.  Cardiovascular: DeniesChest pain at rest. Denies Chest pain with exertion. Denies Irregular heartbeat. Denies Palpitations.  Gastrointestinal: Denies Abdominal pain. DeniesDiarrhea. Denies Nausea. Denies Vomiting. Denies Hematemesis or Hematochezia.  Genitourinary: Denies Dysuria. Denies Urinary frequency. Denies Urinary urgency. Denies Blood in urine.  Endocrine:  Denies Cold intolerance. Denies Excessive thirst. Denies Heat intolerance. Denies Weight loss. Denies Weight gain.  Musculoskeletal: Denies Painful joints. Denies Weakness.  Integumentary: Denies Rash. Denies Itching. Denies Dry skin.  Neurologic: Denies Dizziness. Denies Fainting. Denies Headache.  Psychiatric: Denies Depression. Denies Anxiety. Denies Suicidal/Homicidal ideations.    All Other ROS: Negative except as stated in HPI.       Objective:     /76 (BP Location: Left arm, Patient Position: Sitting, BP Method: Medium (Manual))   Pulse 81   Resp 14   Ht 6' (1.829 m)   Wt 107 kg (236 lb)   SpO2 98%   BMI 32.01 kg/m²     Physical Exam    General: Alert and oriented, No acute distress.  Head: Normocephalic, Atraumatic.  Eye: Pupils are equal, round and reactive to light, Extraocular movements are intact, Sclera non-icteric.  Ears/Nose/Throat: Normal, Mucosa moist,Clear.  Neck/Thyroid: Supple, Non-tender, No carotid bruit, No palpable thyromegaly or thyroid nodule, No lymphadenopathy, No JVD, Full range of motion.  Respiratory: Clear to auscultation bilaterally; No wheezes, rales or rhonchi,Non-labored respirations, Symmetrical chest wall expansion.  Cardiovascular: Regular rate and rhythm, S1/S2 normal, No murmurs, rubs or gallops.  Gastrointestinal: Soft, Non-tender, Non-distended, Normal bowel sounds, No palpable organomegaly.  Musculoskeletal: Normal range of motion. No vertebral TTP.  Good  strength to jose Ues.  Integumentary: Warm, Dry, Intact, No suspicious lesions or rashes.  Extremities: No clubbing, cyanosis or edema  Neurologic: No focal deficits, Cranial Nerves II-XII are grossly intact, Motor strength normal upper and lower extremities, Sensory exam intact.  Psychiatric: Normal interaction, Coherent speech, Euthymic mood, Appropriate affect       The 10-year ASCVD risk score (Albino DK, et al., 2019) is: 40.2%    Values used to calculate the score:      Age: 72 years      Sex: Male       Is Non- : No      Diabetic: Yes      Tobacco smoker: No      Systolic Blood Pressure: 136 mmHg      Is BP treated: Yes      HDL Cholesterol: 62 mg/dL      Total Cholesterol: 186 mg/dL      Assessment:       ICD-10-CM ICD-9-CM   1. Pre-op evaluation  Z01.818 V72.84   2. Cataract, unspecified cataract type, unspecified laterality  H26.9 366.9   3. Neck pain  M54.2 723.1        Plan:     1. Pre-op evaluation  - Basic Metabolic Panel  - CBC Auto Differential  - IN OFFICE EKG 12-LEAD (to Muse)    2. Cataract, unspecified cataract type, unspecified laterality  - Basic Metabolic Panel  - CBC Auto Differential  - IN OFFICE EKG 12-LEAD (to Muse)    3. Neck pain  Discussed rec to proceed with PT prior to ordering MRI.   If s/s persist/worsen, consider MRI c-spine at that time, as well as referral to spinal specialist for possible OLVIN.  Tylenol Arthritis 2 tabs 3x/day as needed  Ice/heat    The patient is cleared for the planned procedure as scheduled as all of their chronic conditions are optimally controlled.       Medication List with Changes/Refills   Current Medications    FELODIPINE (PLENDIL) 5 MG 24 HR TABLET    TAKE ONE TABLET BY MOUTH EVERY DAY       Start Date: 5/23/2022 End Date: --    GLYBURIDE (DIABETA) 5 MG TABLET    TAKE ONE TABLET BY MOUTH EVERY DAY       Start Date: 8/22/2022 End Date: --    HYDROCHLOROTHIAZIDE (HYDRODIURIL) 12.5 MG TAB    TAKE ONE TABLET BY MOUTH EVERY DAY       Start Date: 8/22/2022 End Date: --    METFORMIN (GLUCOPHAGE-XR) 500 MG ER 24HR TABLET    Take 500 mg by mouth.       Start Date: 1/20/2022 End Date: --    PIOGLITAZONE (ACTOS) 30 MG TABLET    Take 1 tablet (30 mg total) by mouth once daily.       Start Date: 7/25/2022 End Date: 7/25/2023    SIMVASTATIN (ZOCOR) 40 MG TABLET    TAKE ONE TABLET BY MOUTH AT BEDTIME       Start Date: 2/15/2023 End Date: --    TRULICITY 1.5 MG/0.5 ML PEN INJECTOR    INJECT ONE PEN SUB-Q ONCE WEEKLY       Start Date: 5/17/2022 End  Date: --          No follow-ups on file.  In addition to their scheduled follow up, the patient has also been instructed to follow up on as needed basis.

## 2023-05-08 NOTE — PROGRESS NOTES
"Subjective:      Patient ID: Ace Gomes is a 72 y.o. male.    Chief Complaint: no appetite (Pt is c/o no appetite since last Wednesday or so, not sleeping, achy, and doesn't want to do anything, pt is constipated on and off, stomach is gurgling with noting in it. Pt has been taking Tylenol and Advil for pinched nerve in neck.)      HPI: Patient here today for c/o dec appetite and weight loss x 1 week. I did see patient a few times recently for pre op for cataract sx 1 month ago and hematuria/dysuria 3 months ago. Reports issues with sleeping, aches, and general malaise. Some intermittent constipation with inc bowel sounds and "gurgling" noted. Additionally, issues with neck pain, which he has been taken Advil and Tylenol. Requesting refill of Mobic 15mg as it worked well. Temp max 100. Some nausea yesterday. Last BM 2 days ago. No darkened or bloody stool. Significant h/o T2DM with stable readings noted ranging 90s-200s.     Review of patient's allergies indicates:  No Known Allergies    Review of Systems  Constitutional: h/o low grade fever, No chills, No sweats, inc fatigue, No weight loss.  Eyes: No blurring.  Ear/Nose/Mouth/Throat: No nasal congestion, No vertigo.  Respiratory: No shortness of breath, No cough, No sputum production, No wheezing, No exertional dyspnea.   Cardiovascular: No chest pain, No palpitations, No claudication, No orthopnea, No peripheral edema.  Gastrointestinal: occ nausea, No vomiting, No diarrhea, No rectal bleeding, occ constipation, No abdominal pain.  Genitourinary: No dysuria, No hematuria, No frequency.  Endocrine: No excessive thirst, No polyuria, No cold intolerance, No heat intolerance.  Musculoskeletal: neck pain  Integumentary: No rash, No ecchymosis.  Neurologic: No altered mental status, occ bitemporal headaches.  Psychiatrics: No anxiety,No depression, No SI/HI.  Objective:   Visit Vitals  /72   Pulse 102   Resp 16   Ht 6' (1.829 m)   Wt 106.1 kg (234 lb) "   SpO2 96%   BMI 31.74 kg/m²     The patient's weight trend is below:   Wt Readings from Last 4 Encounters:   05/08/23 106.1 kg (234 lb)   03/03/23 107 kg (236 lb)   02/10/23 107.8 kg (237 lb 9.6 oz)   11/29/22 108.4 kg (239 lb)        Physical Exam  Vitals and nursing note reviewed.   Constitutional:       General: He is not in acute distress.     Appearance: Normal appearance. He is normal weight. He is not ill-appearing, toxic-appearing or diaphoretic.   HENT:      Head: Normocephalic and atraumatic.      Right Ear: Tympanic membrane, ear canal and external ear normal.      Left Ear: Tympanic membrane, ear canal and external ear normal.      Nose: Nose normal. No congestion or rhinorrhea.      Mouth/Throat:      Mouth: Mucous membranes are moist.      Pharynx: Oropharynx is clear. No oropharyngeal exudate or posterior oropharyngeal erythema.   Eyes:      General: No scleral icterus.        Right eye: No discharge.         Left eye: No discharge.      Extraocular Movements: Extraocular movements intact.      Conjunctiva/sclera: Conjunctivae normal.      Pupils: Pupils are equal, round, and reactive to light.      Comments: Pale conjunctivae     Neck:      Vascular: No carotid bruit.   Cardiovascular:      Rate and Rhythm: Normal rate and regular rhythm.      Pulses: Normal pulses.      Heart sounds: No murmur heard.    No friction rub. No gallop.   Pulmonary:      Effort: Pulmonary effort is normal. No respiratory distress.      Breath sounds: Normal breath sounds. No stridor. No wheezing, rhonchi or rales.   Abdominal:      General: Abdomen is flat. Bowel sounds are normal. There is no distension.      Palpations: Abdomen is soft. There is no mass.      Tenderness: There is no abdominal tenderness. There is no guarding or rebound.      Hernia: No hernia is present.   Musculoskeletal:         General: No swelling, tenderness, deformity or signs of injury. Normal range of motion.      Cervical back: Normal range of  motion and neck supple. No rigidity or tenderness.      Right lower leg: No edema.      Left lower leg: No edema.   Lymphadenopathy:      Cervical: No cervical adenopathy.   Skin:     General: Skin is warm and dry.      Capillary Refill: Capillary refill takes less than 2 seconds.      Coloration: Skin is not jaundiced or pale.      Findings: No bruising, erythema, lesion or rash.   Neurological:      General: No focal deficit present.      Mental Status: He is alert and oriented to person, place, and time. Mental status is at baseline.      Motor: No weakness.      Coordination: Coordination normal.      Gait: Gait normal.   Psychiatric:         Mood and Affect: Mood normal.         Thought Content: Thought content normal.         Judgment: Judgment normal.       Assessment/Plan:   1. Fatigue, unspecified type  Work up with labs and UA  Advised healthy diet with adequate caloric consumption    - CBC Auto Differential  - Comprehensive Metabolic Panel  - Hemoglobin A1C  - TSH  - Urinalysis, Reflex to Urine Culture  - Sedimentation rate    2. Weight loss  Weight stable at today's OV    - CBC Auto Differential  - Comprehensive Metabolic Panel  - Hemoglobin A1C  - TSH  - Urinalysis, Reflex to Urine Culture    3. Other constipation  Metamucil daily  Inc fluids  OTCs as needed    - CBC Auto Differential  - Comprehensive Metabolic Panel  - Hemoglobin A1C  - TSH  - Urinalysis, Reflex to Urine Culture    4. Decreased appetite  Rec small, frequent meals    - CBC Auto Differential  - Comprehensive Metabolic Panel  - Hemoglobin A1C  - TSH  - Urinalysis, Reflex to Urine Culture    5. Adenocarcinoma of rectum  In remission x 20+ years.    - CBC Auto Differential  - Comprehensive Metabolic Panel  - Hemoglobin A1C  - TSH  - Urinalysis, Reflex to Urine Culture    6. Other specified diabetes mellitus without complication, without long-term current use of insulin    - CBC Auto Differential  - Comprehensive Metabolic Panel  -  Hemoglobin A1C  - TSH  - Urinalysis, Reflex to Urine Culture      Medication List with Changes/Refills   Current Medications    DULAGLUTIDE (TRULICITY) 1.5 MG/0.5 ML PEN INJECTOR    INJECT 0.5ML SUBCUTANEOUS WEEKLY    FELODIPINE (PLENDIL) 5 MG 24 HR TABLET    TAKE ONE TABLET BY MOUTH EVERY DAY    GLYBURIDE (DIABETA) 5 MG TABLET    TAKE ONE TABLET BY MOUTH EVERY DAY    HYDROCHLOROTHIAZIDE (HYDRODIURIL) 12.5 MG TAB    TAKE ONE TABLET BY MOUTH EVERY DAY    MELOXICAM (MOBIC) 15 MG TABLET    Take 15 mg by mouth.    METFORMIN (GLUCOPHAGE-XR) 500 MG ER 24HR TABLET    Take 500 mg by mouth.    PIOGLITAZONE (ACTOS) 30 MG TABLET    Take 1 tablet (30 mg total) by mouth once daily.    PREDNISOLONE ACETATE (PRED FORTE) 1 % DRPS    Place into both eyes.    PROLENSA 0.07 % DROP    SMARTSIG:In Eye(s)    SIMVASTATIN (ZOCOR) 40 MG TABLET    TAKE ONE TABLET BY MOUTH AT BEDTIME        No follow-ups on file.    Chemistry:  Lab Results   Component Value Date     03/03/2023    K 4.2 03/03/2023    CHLORIDE 102 03/03/2023    BUN 16.7 03/03/2023    CREATININE 0.81 03/03/2023    EGFRNORACEVR >60 03/03/2023    GLUCOSE 179 (H) 03/03/2023    CALCIUM 9.8 03/03/2023    ALKPHOS 59 11/28/2022    LABPROT 6.7 11/28/2022    ALBUMIN 4.1 11/28/2022    BILIDIR 0.5 01/28/2022    IBILI 0.60 01/28/2022    AST 18 11/28/2022    ALT 14 11/28/2022        Lab Results   Component Value Date    HGBA1C 6.1 11/28/2022        Hematology:  Lab Results   Component Value Date    WBC 5.7 03/03/2023    HGB 14.8 03/03/2023    HCT 43.2 03/03/2023     03/03/2023       Lipid Panel:  Lab Results   Component Value Date    CHOL 186 07/22/2022    HDL 62 (H) 07/22/2022    LDL 92.00 07/22/2022    TRIG 158 (H) 07/22/2022    TOTALCHOLEST 3 07/22/2022        Urine:  Lab Results   Component Value Date    COLORUA Yellow 02/10/2023    APPEARANCEUA Clear 02/10/2023    SGUA 1.011 02/10/2023    PHUA 6.0 02/10/2023    PROTEINUA Negative 02/10/2023    GLUCOSEUA 2+ (A) 02/10/2023     KETONESUA Negative 02/10/2023    BLOODUA Negative 02/10/2023    NITRITESUA Negative 02/10/2023    LEUKOCYTESUR Trace (A) 02/10/2023    RBCUA 0-5 02/10/2023    WBCUA 11-20 (A) 02/10/2023    BACTERIA Many (A) 02/10/2023    CREATRANDUR 174.3 (H) 11/28/2022

## 2023-05-09 NOTE — TELEPHONE ENCOUNTER
----- Message from Adebayo Sanford sent at 5/9/2023  4:07 PM CDT -----  .Type:  Test Results    Who Called: pt  Name of Test (Lab/Mammo/Etc): blood work  Date of Test: 05/08/23  Ordering Provider:   Where the test was performed:   Would the patient rather a call back or a response via [facility]? Call back  Best Call Back Number: [phone]   Additional Information:

## 2023-05-09 NOTE — TELEPHONE ENCOUNTER
----- Message from Lanie Longoria NP sent at 5/9/2023  4:52 PM CDT -----  Please call patient and check on him. Let him know that labs note mild anemia. Also, inflammatory marker noted to be elevated at 57.  Spoke to Dr. Zamorano. He would like to get more labs to eval iron studies and possible autoimmune issues. Please let him know that labs in system to be done at . I apologize for inconvenience.

## 2023-05-09 NOTE — TELEPHONE ENCOUNTER
Pt expressed understanding and states is feels the same and will go get labs done at [facility] in the morning

## 2023-05-10 NOTE — TELEPHONE ENCOUNTER
----- Message from Lanie Longoria NP sent at 5/10/2023  3:43 PM CDT -----  I see that the YOVANA I ordered looks like it was cancelled. Can you please call the lab and check on this? Once available, please forward all results to Dr. Zamorano for review/result. Thanks

## 2023-05-10 NOTE — TELEPHONE ENCOUNTER
Spoke to Yokasta in chemistry at lab, she did not state why YOVANA cancelled but it has been reordered and will result soon.

## 2023-05-11 NOTE — TELEPHONE ENCOUNTER
Pt states he needs a referral to Dr Darren Norwood for a colonoscopy. Also complains of having no appetite for the last two weeks. Please advise

## 2023-05-11 NOTE — TELEPHONE ENCOUNTER
Per MD, iron is low. Start OTC iron supplement. Has he had a colonoscopy in the past? If not, needs to get in touch with GI.  CRP is elevated, waiting on rheumatologic work up to finalize.

## 2023-05-17 NOTE — TELEPHONE ENCOUNTER
Pt is very weak,no appetite, running a fever last night 100.3  and 101.0 On Monday night and during the day 100.3 and Sunday 101.0. Pt was taking tylenol but stopped for labs, pt gets worn out just walking to chair  this morning but has been running hi all week. Please advise    Dr Truong still has not gotten referral please address.

## 2023-05-17 NOTE — TELEPHONE ENCOUNTER
Spoke to patient regarding ongoing issues. Reports dry cough began about 1 week ago. Fever up to 101 over the weekend. General malaise, dec appetite, and 5 lb weight loss since LOV.  No further pain s/s. He has since been referred to GI for f/u c-scope. Awaiting call from Dr. Truong's office. Collab with Dr. Zamorano. Rec [facility] CT chest/abd/pelvis w/wo contrast to be set up at [facility].    Harshil, I put referral in for Hem, Dr. Sanabria, please send.  Also, I did place another referral for GI. If duplicate, ok to d/c. Also please arrange for CT at [facility]. Thanks.

## 2023-05-17 NOTE — TELEPHONE ENCOUNTER
Dr. Sanabria is an Internal Physician, they will contact patient to schedule.    Referral to Dr. Norwood has been printed and Faxed

## 2023-05-18 NOTE — PROGRESS NOTES
Subjective:      Patient ID: Ace Gomes is a 72 y.o. male.    Chief Complaint: Follow-up (Pt is here for f/u and labs and CT review pt is very lethargic with no appetite. )      HPI: Patient here today for recheck.  I did see patient about two weeks ago regarding abrupt onset of profound fatigue. Work up noted new anemia with abnormal iron studies. Discussed with Dr. Zamorano, who recommended to proceed with Hem/Onc referral and pan CT eval. Repeat CBC done yesterday with blood cx. BC neg.  Results of CT abdomen/pelvis unfortunately reveal focal hypoenhancing solid lesion at the posterior junction of the body and tail of the pancreas, most likely a primary pancreatic malignancy although metastasis is also a possibility and post remote resection of the lateral segment of the left hepatic lobe with numerous metastatic lesions scattered throughout the residual liver. Given remote, significant h/o colorectal CA with liver resection 20+ years ago, these results were discussed with Dr. Zamorano, who also discussed with patient/wife at today's office visit.     Review of patient's allergies indicates:  No Known Allergies    Review of Systems  Constitutional: h/o fever, No chills, No sweats, fatigue, slight weight loss.    Objective:   Visit Vitals  BP (!) 140/70 (BP Location: Left arm, Patient Position: Sitting, BP Method: Medium (Manual))   Pulse 105   Temp 98.9 °F (37.2 °C)   Resp 16   Ht 6' (1.829 m)   Wt 106.1 kg (234 lb)   SpO2 96%   BMI 31.74 kg/m²     The patient's weight trend is below:   Wt Readings from Last 4 Encounters:   05/19/23 106.1 kg (234 lb)   05/08/23 106.1 kg (234 lb)   03/03/23 107 kg (236 lb)   02/10/23 107.8 kg (237 lb 9.6 oz)      Physical Exam  Vitals and nursing note reviewed.   Constitutional:       General: He is not in acute distress.     Appearance: Normal appearance. He is normal weight. He is not ill-appearing, toxic-appearing or diaphoretic.   HENT:      Head: Normocephalic and  atraumatic.   Pulmonary:      Effort: Pulmonary effort is normal.   Neurological:      General: No focal deficit present.      Mental Status: He is alert and oriented to person, place, and time. Mental status is at baseline.       Assessment/Plan:   1. Pancreatic mass  Referrals sent to Dr. Jorgensen for likely biopsy and back to Dr. Newton for eval/tx    - Ambulatory referral/consult to Gastroenterology; Future    2. Liver masses  See above    3. Fever, unspecified fever cause  Seems to be improved    4. Anemia, unspecified type    - ferric maltol 30 mg Cap; Take 1 each by mouth 2 (two) times a day.  Dispense: 60 capsule; Refill: 3    5. Other constipation  OTCs as needed    6. Weight loss  See above  Labs reviewed while in office today    7. Decreased appetite  Correlate with disease processes    8. 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   Current Medications    DULAGLUTIDE (TRULICITY) 1.5 MG/0.5 ML PEN INJECTOR    INJECT 0.5ML SUBCUTANEOUS WEEKLY    FELODIPINE (PLENDIL) 5 MG 24 HR TABLET    TAKE ONE TABLET BY MOUTH EVERY DAY    GLYBURIDE (DIABETA) 5 MG TABLET    TAKE ONE TABLET BY MOUTH EVERY DAY    HYDROCHLOROTHIAZIDE (HYDRODIURIL) 12.5 MG TAB    TAKE ONE TABLET BY MOUTH EVERY DAY    MELOXICAM (MOBIC) 15 MG TABLET    Take 15 mg by mouth.    METFORMIN (GLUCOPHAGE-XR) 500 MG ER 24HR TABLET    Take 500 mg by mouth.    PIOGLITAZONE (ACTOS) 30 MG TABLET    Take 1 tablet (30 mg total) by mouth once daily.    PREDNISOLONE ACETATE (PRED FORTE) 1 % DRPS    Place into both eyes.    PROLENSA 0.07 % DROP    SMARTSIG:In Eye(s)    SIMVASTATIN (ZOCOR) 40 MG TABLET    TAKE ONE TABLET BY MOUTH AT BEDTIME        No follow-ups on file.    Chemistry:  Lab Results   Component  Value Date     (L) 05/10/2023    K 4.4 05/10/2023    CHLORIDE 96 (L) 05/10/2023    BUN 16.1 05/10/2023    CREATININE 0.94 05/10/2023    EGFRNORACEVR >60 05/10/2023    GLUCOSE 255 (H) 05/10/2023    CALCIUM 9.2 05/10/2023    ALKPHOS 78 05/08/2023    LABPROT 7.3 05/08/2023    ALBUMIN 3.2 (L) 05/08/2023    BILIDIR 0.5 01/28/2022    IBILI 0.60 01/28/2022    AST 13 05/08/2023    ALT 10 05/08/2023        Lab Results   Component Value Date    HGBA1C 6.7 05/08/2023        Hematology:  Lab Results   Component Value Date    WBC 10.79 05/18/2023    HGB 11.4 (L) 05/18/2023    HCT 35.0 (L) 05/18/2023     05/18/2023       Lipid Panel:  Lab Results   Component Value Date    CHOL 186 07/22/2022    HDL 62 (H) 07/22/2022    LDL 92.00 07/22/2022    TRIG 158 (H) 07/22/2022    TOTALCHOLEST 3 07/22/2022        Urine:  Lab Results   Component Value Date    COLORUA Yellow 05/08/2023    APPEARANCEUA Clear 05/08/2023    SGUA 1.012 05/08/2023    PHUA 5.5 05/08/2023    PROTEINUA 1+ (A) 05/08/2023    GLUCOSEUA Negative 05/08/2023    KETONESUA Negative 05/08/2023    BLOODUA Negative 05/08/2023    NITRITESUA Negative 05/08/2023    LEUKOCYTESUR Negative 05/08/2023    RBCUA <5 05/08/2023    WBCUA <5 05/08/2023    BACTERIA None Seen 05/08/2023    CREATRANDUR 174.3 (H) 11/28/2022

## 2023-05-22 NOTE — TELEPHONE ENCOUNTER
Please disregard previous, incomplete message. Can you please clarify when he can get in with Dr. Newton, as he was previously est with him? D/c referral to Dr. Palomo. I have sent RX for Xanax 0.5mg 1/2-1 tab as needed at bedtime for anxiety/sleep. Also, if he is experiencing elevated BS, may attempt inc in metformin XR to 1000mg daily. Please have him inc water intake and continue to monitor.  F/u as needed for persistent issues. As far as fever, blood cx neg and fevers likely related to current issues identified. Rec Tylenol 500mg 2 tabs up to 4x/day.

## 2023-05-22 NOTE — TELEPHONE ENCOUNTER
Pt called MD Sanford and he needs all OVN, labs, scans etc. Pt is going to set up appt. Cannot see Dr Hardy until June 13 can see Dr Marcel Grimaldo? What to do about the fever and needs something for sleep and wants hardcopy of records. Wife watching carbs and sugars and is there something else they can do about BS is there a special diet.

## 2023-05-22 NOTE — TELEPHONE ENCOUNTER
Pt expressed verbal understanding and they will call Dr Newton and let us know if he needs a different referral.

## 2023-05-22 NOTE — TELEPHONE ENCOUNTER
----- Message from Lanie Longoria NP sent at 5/22/2023  8:32 AM CDT -----  Please call and check on patient. Any further fever? Let him know that UA notes trace ketones, glucose (r/t meds), and trace protein. No infection.

## 2023-05-31 NOTE — TELEPHONE ENCOUNTER
Spoke to pt's wife, states [facility] diagnosed him with stage 4 pancreatic cancer on 5/19/23. He did put in a call to Dr Newton's office to see if he can be seen soon. Please advise   Computed Treatment Time In Min (Will Render The Same As Calculated Treatment Time If Left Blank): 0.41

## 2023-05-31 NOTE — TELEPHONE ENCOUNTER
----- Message from Kaila Beltrán sent at 5/31/2023 11:51 AM CDT -----  Regarding: Needs Medical Advice  Type:  Needs Medical Advice    Who Called: pt's wife Trisha      Would the patient rather a call back or a response via [facility]?   Best Call Back Number: [phone]  Additional Information:  pt's wife called to see what is next steps for the patient. Pt had a test done this morning & was diagnosed w/ cancer & pt's wife wanted to know what to be done next. She stated it would be too long to wait until 6/12 which is the pt's upcoming appt to find out what to do. Please contact Trisha.

## 2023-05-31 NOTE — TELEPHONE ENCOUNTER
Whoever diagnosed with cancer should send to hem/onc, Dr KEVIN is not here to move appt up. I would suggest keep scheduled appt.

## 2023-05-31 NOTE — ANESTHESIA POSTPROCEDURE EVALUATION
Anesthesia Post Evaluation    Patient: Ace Gomes    Procedure(s) Performed: Procedure(s) (LRB):  UPPER EUS W/ POSS FNA (N/A)  EGD, WITH CLOSED BIOPSY    Final Anesthesia Type: general      Patient location during evaluation: PACU  Patient participation: Yes- Able to Participate  Level of consciousness: awake and alert  Post-procedure vital signs: reviewed and stable  Pain management: adequate  Airway patency: patent    PONV status at discharge: No PONV  Anesthetic complications: no      Cardiovascular status: blood pressure returned to baseline  Respiratory status: spontaneous ventilation and unassisted  Hydration status: euvolemic  Follow-up needed           Vitals Value Taken Time   /76 05/31/23 1015   Temp 98 05/31/23 1553   Pulse 98 05/31/23 1027   Resp 10 05/31/23 1553   SpO2 92 % 05/31/23 1027         No case tracking events are documented in the log.      Pain/Nic Score: Nic Score: 10 (5/31/2023 10:35 AM)  Modified Nic Score: 20 (5/31/2023 10:35 AM)

## 2023-05-31 NOTE — TRANSFER OF CARE
"Anesthesia Transfer of Care Note    Patient: Ace Gomes    Procedure(s) Performed: Procedure(s) (LRB):  UPPER EUS W/ POSS FNA (N/A)  EGD, WITH CLOSED BIOPSY    Patient location: OPS    Anesthesia Type: MAC    Transport from OR: Transported from OR on room air with adequate spontaneous ventilation    Post pain: adequate analgesia    Post assessment: no apparent anesthetic complications    Post vital signs: stable    Level of consciousness: awake and alert    Complications: none    Transfer of care protocol was followed      Last vitals:   Visit Vitals  BP (!) 147/80   Pulse 109   Temp 37.7 °C (99.9 °F) (Oral)   Resp 18   Ht 5' 11" (1.803 m)   Wt 101.7 kg (224 lb 3.3 oz)   SpO2 (!) 93%   BMI 31.27 kg/m²     "

## 2023-05-31 NOTE — ANESTHESIA PREPROCEDURE EVALUATION
05/31/2023  Ace Gomes is a 72 y.o., male.      Pre-op Assessment    I have reviewed the Patient Summary Reports.     I have reviewed the Nursing Notes. I have reviewed the NPO Status.   I have reviewed the Medications.     Review of Systems  Anesthesia Hx:  No problems with previous Anesthesia    Social:  Former Smoker    Cardiovascular:   Hypertension Denies MI.    Denies Angina.  Denies CHF. hyperlipidemia    Pulmonary:   Denies COPD.  Denies Asthma. Sleep apnea: snoring.    Renal/:   Denies Chronic Renal Disease. no renal calculi     Hepatic/GI:   Denies Hiatal Hernia.  Denies GERD.  Denies Hepatitis. Hx Multiple liver lesions  Hx liver resection 2007     Neurological:   Denies CVA. Denies Seizures.    Endocrine:   Diabetes, well controlled, type 2 Denies Hypothyroidism. Denies Hyperthyroidism.  Obesity / BMI > 30  Psych:   anxiety          Physical Exam  General: Well nourished, Cooperative, Alert and Oriented    Airway:  Mallampati: I   Mouth Opening: Normal  TM Distance: Normal  Tongue: Normal  Neck ROM: Normal ROM    Dental:  Intact        Anesthesia Plan  Type of Anesthesia, risks & benefits discussed:    Anesthesia Type: Gen Natural Airway  Intra-op Monitoring Plan: Standard ASA Monitors  Induction:  IV  Informed Consent: Informed consent signed with the Patient and all parties understand the risks and agree with anesthesia plan.  All questions answered.   ASA Score: 2  Day of Surgery Review of History & Physical: H&P Update referred to the surgeon/provider.    Ready For Surgery From Anesthesia Perspective.     .

## 2023-05-31 NOTE — TELEPHONE ENCOUNTER
Pt cancelled appt with Dr Sanabria because he saw Dr Newton in the past and states he is not going to keep appt at [facility] because he wants to be treated locally. Can you change referral to Dr Newton and I will resend. Please advise

## 2023-06-02 PROBLEM — I26.94 MULTIPLE SUBSEGMENTAL PULMONARY EMBOLI WITHOUT ACUTE COR PULMONALE: Status: ACTIVE | Noted: 2023-01-01

## 2023-06-02 PROBLEM — C25.9 PANCREATIC CANCER METASTASIZED TO LIVER: Status: ACTIVE | Noted: 2023-01-01

## 2023-06-02 PROBLEM — I26.99 PULMONARY EMBOLISM: Status: ACTIVE | Noted: 2023-01-01

## 2023-06-02 PROBLEM — C78.7 PANCREATIC CANCER METASTASIZED TO LIVER: Status: ACTIVE | Noted: 2023-01-01

## 2023-06-02 NOTE — PROGRESS NOTES
"Subjective:      Patient ID: Ace Gomes is a 72 y.o. male.    Chief Complaint: Fever (Pt had a fever of 100.5 last night and went up to 101.7 but down during the day. Pt had an endoscopy 5/31/2023 and it was fine then. Pt is having post nasal drip pt coughed up greenish/yellow mucus )      HPI: Patient here today for c/o fever and cough x 3 weeks.  Fevers have been intermittent over the last few weeks follow dx of pancreatic mass with mets to liver. EGD done per Dr. Jorgensen on 5/31/23 Inc issues with exertional SOB. No chest pain or h/o CV issues.  Temp yesterday 101.7 with relief of analgesics.  Cough occ productive with green/yellow mucous.    Additionally, inc SOB lately, especially on exertion.  HR is elevated. Admits to more stagnant behavior with lack of energy. Dec appetite with small meals/caloric consumption.    BS have been elevated ranging in 200s-300s at times.     Review of patient's allergies indicates:  No Known Allergies    Review of Systems  Constitutional: fever, No chills, No sweats, fatigue, No weight loss.  Ear/Nose/Mouth/Throat: No nasal congestion, No vertigo. No PND  Respiratory: No shortness of breath, congested cough, No sputum production, No wheezing, exertional dyspnea.   Cardiovascular: No chest pain, No palpitations, No claudication, No orthopnea, No peripheral edema.  Gastrointestinal: No nausea, No vomiting, No diarrhea, No rectal bleeding, No constipation, No abdominal pain. Dec appetite    Objective:   Visit Vitals  /72 (BP Location: Left arm, Patient Position: Sitting, BP Method: Medium (Manual))   Pulse (!) 113   Temp 99.8 °F (37.7 °C)   Resp 16   Ht 5' 11" (1.803 m)   Wt 103 kg (227 lb)   SpO2 98%   BMI 31.66 kg/m²     The patient's weight trend is below:   Wt Readings from Last 4 Encounters:   06/02/23 103 kg (227 lb)   05/31/23 101.7 kg (224 lb 3.3 oz)   05/19/23 106.1 kg (234 lb)   05/08/23 106.1 kg (234 lb)        Physical Exam  Vitals and nursing note " reviewed.   Constitutional:       General: He is not in acute distress.     Appearance: Normal appearance. He is normal weight. He is ill-appearing and diaphoretic. He is not toxic-appearing.   HENT:      Head: Normocephalic and atraumatic.      Right Ear: Tympanic membrane, ear canal and external ear normal.      Left Ear: Tympanic membrane, ear canal and external ear normal.      Nose: Congestion present. No rhinorrhea.      Mouth/Throat:      Mouth: Mucous membranes are moist.      Pharynx: Oropharynx is clear. No oropharyngeal exudate or posterior oropharyngeal erythema.   Cardiovascular:      Rate and Rhythm: Regular rhythm. Tachycardia present.      Pulses: Normal pulses.      Heart sounds: No murmur heard.    No friction rub. No gallop.   Pulmonary:      Effort: Pulmonary effort is normal. No respiratory distress.      Breath sounds: Normal breath sounds. No stridor. No wheezing, rhonchi or rales.   Musculoskeletal:         General: No swelling, tenderness, deformity or signs of injury. Normal range of motion.      Cervical back: Normal range of motion and neck supple. No rigidity or tenderness.   Lymphadenopathy:      Cervical: No cervical adenopathy.   Skin:     General: Skin is warm.      Coloration: Skin is not jaundiced or pale.      Findings: No bruising, erythema or lesion.   Neurological:      General: No focal deficit present.      Mental Status: He is alert and oriented to person, place, and time. Mental status is at baseline.      Motor: No weakness.      Coordination: Coordination normal.      Gait: Gait normal.   Psychiatric:         Mood and Affect: Mood normal.         Thought Content: Thought content normal.         Judgment: Judgment normal.       Assessment/Plan:   1. Acute cough  RX Z-pack  Inc fluids  OTCs as needed    - azithromycin (Z-JENNIFER) 250 MG tablet; Take 2 tablets by mouth on day 1; Take 1 tablet by mouth on days 2-5  Dispense: 6 tablet; Refill: 0    2. Exertional shortness of  breath  Concern for PE given recent dx and stagnant behavior-work up with CT PE protocol today.    - IN OFFICE EKG 12-LEAD (to Muse)  - azithromycin (Z-JENNIFER) 250 MG tablet; Take 2 tablets by mouth on day 1; Take 1 tablet by mouth on days 2-5  Dispense: 6 tablet; Refill: 0    3. Fever in other diseases  Likely r/t ongoing disease process.   Continue to monitor  Tylenol/Motrin as needed  Inc fluids    - azithromycin (Z-JENNIFER) 250 MG tablet; Take 2 tablets by mouth on day 1; Take 1 tablet by mouth on days 2-5  Dispense: 6 tablet; Refill: 0    4. Other constipation  Ok to continue Miralax daily    5. Type 2 diabetes mellitus with hyperglycemia, without long-term current use of insulin  Inc glyburide to 10mg daily  Monitor BS  Avoid skipping meals    - glyBURIDE (DIABETA) 5 MG tablet; Take 2 tablets (10 mg total) by mouth daily with breakfast.  Dispense: 180 tablet; Refill: 3    6. Tachycardia  See above  EKG reviewed with ST HR at 107    - IN OFFICE EKG 12-LEAD (to Muse)    7. Lesion of liver  Follow up with specialist that is also caring for this problem.  Referral previously sent to GI    8. Chest pain on breathing    - CTA Chest Non-Coronary (PE Studies); Future    9. Nausea  Zofran prior to meals  Consider Megace in future if continued weight loss    - ondansetron (ZOFRAN-ODT) 4 MG TbDL; Take 1 tablet (4 mg total) by mouth every 8 (eight) hours as needed (nausea).  Dispense: 1 tablet; Refill: 0    10. Other iron deficiency anemia  Continue Accufer  Follow up with specialist that is also caring for this problem.    I spent a total of 50 minutes on the day of the visit.This includes face to face time and non-face to face time preparing to see the patient (eg, review of tests), obtaining and/or reviewing separately obtained history, documenting clinical information in the electronic or other health record, independently interpreting results and communicating results to the patient/family/caregiver, or care  coordinator.    Medication List with Changes/Refills   New Medications    AZITHROMYCIN (Z-JENNIFER) 250 MG TABLET    Take 2 tablets by mouth on day 1; Take 1 tablet by mouth on days 2-5    ONDANSETRON (ZOFRAN-ODT) 4 MG TBDL    Take 1 tablet (4 mg total) by mouth every 8 (eight) hours as needed (nausea).   Current Medications    ALPRAZOLAM (XANAX) 0.5 MG TABLET    Take 1 tablet (0.5 mg total) by mouth nightly as needed for Anxiety.    DULAGLUTIDE (TRULICITY) 1.5 MG/0.5 ML PEN INJECTOR    INJECT 0.5ML SUBCUTANEOUS WEEKLY    FELODIPINE (PLENDIL) 5 MG 24 HR TABLET    TAKE ONE TABLET BY MOUTH EVERY DAY    FERRIC MALTOL 30 MG CAP    Take 1 each by mouth 2 (two) times a day.    HYDROCHLOROTHIAZIDE (HYDRODIURIL) 12.5 MG TAB    TAKE ONE TABLET BY MOUTH EVERY DAY    MELOXICAM (MOBIC) 15 MG TABLET    Take 15 mg by mouth.    METFORMIN (GLUCOPHAGE-XR) 500 MG ER 24HR TABLET    Take 2 tablets (1,000 mg total) by mouth once daily.    MOXIFLOXACIN (VIGAMOX) 0.5 % OPHTHALMIC SOLUTION    Place into both eyes.    PIOGLITAZONE (ACTOS) 30 MG TABLET    Take 1 tablet (30 mg total) by mouth once daily.    PREDNISOLONE ACETATE (PRED FORTE) 1 % DRPS    Place into both eyes.    PROLENSA 0.07 % DROP    SMARTSIG:In Eye(s)    SIMVASTATIN (ZOCOR) 40 MG TABLET    TAKE ONE TABLET BY MOUTH AT BEDTIME   Changed and/or Refilled Medications    Modified Medication Previous Medication    GLYBURIDE (DIABETA) 5 MG TABLET glyBURIDE (DIABETA) 5 MG tablet       Take 2 tablets (10 mg total) by mouth daily with breakfast.    TAKE ONE TABLET BY MOUTH EVERY DAY        No follow-ups on file.    Chemistry:  Lab Results   Component Value Date     (L) 05/31/2023    K 3.5 05/31/2023    CHLORIDE 95 (L) 05/31/2023    BUN 10.1 05/31/2023    CREATININE 0.83 05/31/2023    EGFRNORACEVR >60 05/31/2023    GLUCOSE 193 (H) 05/31/2023    CALCIUM 8.6 (L) 05/31/2023    ALKPHOS 75 05/31/2023    LABPROT 6.7 05/31/2023    ALBUMIN 2.4 (L) 05/31/2023    BILIDIR 0.5 01/28/2022    IBILI  0.60 01/28/2022    AST 20 05/31/2023    ALT 16 05/31/2023        Lab Results   Component Value Date    HGBA1C 6.7 05/08/2023        Hematology:  Lab Results   Component Value Date    WBC 10.87 05/31/2023    HGB 9.0 (L) 05/31/2023    HCT 28.9 (L) 05/31/2023     05/31/2023       Lipid Panel:  Lab Results   Component Value Date    CHOL 186 07/22/2022    HDL 62 (H) 07/22/2022    LDL 92.00 07/22/2022    TRIG 158 (H) 07/22/2022    TOTALCHOLEST 3 07/22/2022        Urine:  Lab Results   Component Value Date    COLORUA Yellow 05/19/2023    APPEARANCEUA Clear 05/19/2023    SGUA 1.016 05/19/2023    PHUA 5.5 05/19/2023    PROTEINUA Trace (A) 05/19/2023    GLUCOSEUA 2+ (A) 05/19/2023    KETONESUA Trace (A) 05/19/2023    BLOODUA Negative 05/19/2023    NITRITESUA Negative 05/19/2023    LEUKOCYTESUR Negative 05/19/2023    RBCUA <5 05/19/2023    WBCUA <5 05/19/2023    BACTERIA None Seen 05/19/2023    CREATRANDUR 174.3 (H) 11/28/2022

## 2023-06-02 NOTE — ED PROVIDER NOTES
Encounter Date: 6/2/2023    SCRIBE #1 NOTE: I, Iban Steele, am scribing for, and in the presence of,  Dr. Krueger. I have scribed the following portions of the note - Other sections scribed: HPI, ROS, Physical Exam, MDM, Attending.     History     Chief Complaint   Patient presents with    Shortness of Breath     Sob x 1 week, Dr Zamorano sent for CT, CT showed PE, recent dx pancreatic cancer w/ mets to liver.      73 y/o male with history of HTN, HLD, DM and recently diagnosed with pancreatic cancer with mets to liver presents to ED for SOB onset 2 weeks ago, worsening since last week.  Pt was sent by Dr. Zamorano, PCP, after CT showed PE.  Pt says he has never had similar SOB before.  His pancreatic cancer was found on CT done because of his SOB.  He has not begun treatment for his cancer yet, but he is currently waiting to be established with oncologist.  Pt denies chest pain, leg swelling, calf pain or blood in stool.     The history is provided by the patient.   Shortness of Breath  This is a new problem. The problem occurs continuously.Episode onset: 2 weeks ago. The problem has been gradually worsening. Pertinent negatives include no chest pain, no leg pain and no leg swelling.   Review of patient's allergies indicates:  No Known Allergies  Past Medical History:   Diagnosis Date    Adenocarcinoma of rectum     Diabetes mellitus, type 2     Gastrointestinal tract imaging abnormality     Hyperlipidemia     Hypertension     Lesion of liver     Other intra-abdominal and pelvic swelling, mass and lump     Personal history of colonic polyps      Past Surgical History:   Procedure Laterality Date    COLON RESECTION      COLONOSCOPY  02/08/2016    EGD, WITH CLOSED BIOPSY  05/31/2023    Procedure: EGD, WITH CLOSED BIOPSY;  Surgeon: Manpreet Jorgensen MD;  Location: [facility];  Service: Gastroenterology;;    ENDOSCOPY  05/31/2023    with U/S    LIVER RESECTION  2007    LIVER SURGERY      thumb surgery Left     ULTRASOUND,  ENDOSCOPIC, WITH FINE NEEDLE ASPIRATION N/A 05/31/2023    Procedure: UPPER EUS W/ POSS FNA;  Surgeon: Manpreet Jorgensen MD;  Location: [facility] OR;  Service: Gastroenterology;  Laterality: N/A;  Hx ofrectal CA s/p resection. Partial resection of the left hepatic lobe. Numerous lesion in theresidual liver up to 2.8cm. 4.5cm lesion in distal BOP/TOP. 2cm portal LN. LABS W/ PROCEDURE     Family History   Problem Relation Age of Onset    Lung cancer Mother     Lung cancer Father     No Known Problems Sister     No Known Problems Brother      Social History     Tobacco Use    Smoking status: Former    Smokeless tobacco: Never   Substance Use Topics    Alcohol use: Never     Comment: Ocassionally    Drug use: Never     Review of Systems   Respiratory:  Positive for shortness of breath.    Cardiovascular:  Negative for chest pain and leg swelling.   Gastrointestinal:  Negative for blood in stool.     Physical Exam     Initial Vitals [06/02/23 1241]   BP Pulse Resp Temp SpO2   (!) 146/74 (!) 122 (!) 26 99.5 °F (37.5 °C) 96 %      MAP       --         Physical Exam    Nursing note and vitals reviewed.  Constitutional: No distress.   HENT:   Head: Normocephalic and atraumatic.   Eyes: Conjunctivae and EOM are normal. Pupils are equal, round, and reactive to light.   Neck: Trachea normal. Neck supple.   Normal range of motion.  Cardiovascular:  Regular rhythm.           No murmur heard.  Tachycardic    Pulmonary/Chest: Breath sounds normal. No respiratory distress. He exhibits no tenderness.   Abdominal: Abdomen is soft. Bowel sounds are normal. There is no abdominal tenderness.   Musculoskeletal:         General: Normal range of motion.      Cervical back: Normal range of motion and neck supple.      Lumbar back: Normal. No tenderness. Normal range of motion.     Neurological: He is alert and oriented to person, place, and time. He has normal strength. No cranial nerve deficit or sensory deficit.   Skin: Skin is warm and dry.    Psychiatric: He has a normal mood and affect.       ED Course   Critical Care    Date/Time: 6/2/2023 2:10 PM  Performed by: Aida Krueger MD  Authorized by: Aida Krueger MD   Direct patient critical care time: 30 minutes  Total critical care time (exclusive of procedural time) : 30 minutes  Critical care was necessary to treat or prevent imminent or life-threatening deterioration of the following conditions: cardiac failure and circulatory failure.  Critical care was time spent personally by me on the following activities: development of treatment plan with patient or surrogate, discussions with consultants, interpretation of cardiac output measurements, evaluation of patient's response to treatment, obtaining history from patient or surrogate, examination of patient, ordering and performing treatments and interventions, ordering and review of laboratory studies, ordering and review of radiographic studies, pulse oximetry, re-evaluation of patient's condition and review of old charts.      Labs Reviewed   COMPREHENSIVE METABOLIC PANEL - Abnormal; Notable for the following components:       Result Value    Sodium Level 130 (*)     Chloride 91 (*)     Glucose Level 273 (*)     Albumin Level 2.8 (*)     Globulin 4.2 (*)     Albumin/Globulin Ratio 0.7 (*)     All other components within normal limits   PROTIME-INR - Abnormal; Notable for the following components:    PT 15.9 (*)     All other components within normal limits   CBC WITH DIFFERENTIAL - Abnormal; Notable for the following components:    WBC 13.79 (*)     RBC 3.23 (*)     Hgb 9.6 (*)     Hct 29.0 (*)     Neut # 10.87 (*)     Mono # 1.33 (*)     IG# 0.14 (*)     All other components within normal limits   LACTIC ACID, PLASMA - Normal   APTT - Normal   TROPONIN I - Normal   B-TYPE NATRIURETIC PEPTIDE - Normal   BLOOD CULTURE OLG   BLOOD CULTURE OLG   CBC W/ AUTO DIFFERENTIAL    Narrative:     The following orders were created for panel order CBC  auto differential.  Procedure                               Abnormality         Status                     ---------                               -----------         ------                     CBC with Differential[[id number]]        Abnormal            Final result                 Please view results for these tests on the individual orders.     EKG Readings: (Independently Interpreted)   Rhythm: Sinus Tachycardia. Heart Rate: 125.   1249  Sinus tachycardia PACs and inferior Q-waves with poor R-wave progression     Imaging Results    None          Medications   enoxaparin injection 100 mg (100 mg Subcutaneous Given 6/2/23 1430)     Medical Decision Making:   History:   Old Medical Records: I decided to obtain old medical records.  Old Records Summarized: records from clinic visits.  Initial Assessment:   See hpi  Independently Interpreted Test(s):   I have ordered and independently interpreted EKG Reading(s) - see prior notes  Clinical Tests:   Lab Tests: Ordered and Reviewed  Radiological Study: Reviewed  Medical Tests: Ordered and Reviewed  Other:   I have discussed this case with another health care provider.        Scribe Attestation:   Scribe #1: I performed the above scribed service and the documentation accurately describes the services I performed. I attest to the accuracy of the note.  Comments: Attending:   Physician Attestation Statement for Scribe #1: I, Aida Krueger MD, personally performed the services described in this documentation. All medical record entries made by the scribe were at my direction and in my presence.  I have reviewed the chart and agree that the record reflects my personal performance and is accurate and complete.        Attending Attestation:           Physician Attestation for Scribe:  Physician Attestation Statement for Scribe #1: I, reviewed documentation, as scribed by Iban Steele in my presence, and it is both accurate and complete.         Medical Decision  Making  Differential diagnoses include, but are not limited to: PE  Cbc, cmp, trop, bnp ordered and reviewed without aute abnormality, h/h similar to previous  Leukocytosis likely stress  Given lovenox 1 mg/kg bid and will discuss with pcp toa dmit given he is symptomatic and tachycadric. Echo pending    Problems Addressed:  Pulmonary embolism: acute illness or injury that poses a threat to life or bodily functions    Amount and/or Complexity of Data Reviewed  External Data Reviewed: labs, radiology and notes.  Labs: ordered.  ECG/medicine tests: ordered and independent interpretation performed.    Risk  OTC drugs.  Prescription drug management.  Decision regarding hospitalization.    Critical Care  Total time providing critical care: 30 minutes          ED Course as of 06/02/23 1449   Fri Jun 02, 2023   1449 Discusssed with dr mata who agrees with plan and recommends consult to oncology [BS]      ED Course User Index  [BS] Aida Krueger MD                 Clinical Impression:   Final diagnoses:  [I26.99] Pulmonary embolism        ED Disposition Condition    Admit                 Aida Krueger MD  06/02/23 1354

## 2023-06-02 NOTE — TELEPHONE ENCOUNTER
----- Message from Nica Bravo sent at 6/2/2023  9:47 AM CDT -----  .Type:  Needs Medical Advice    Who Called: [facility]    Pharmacy name and phone #:  [id number] - [place], [place] - [address]  Would the patient rather a call back or a response via [facility]? Call back   Best Call Back Number: [phone]  Additional Information: Pharmacy needs the quantity on med  ondansetron (ZOFRAN-ODT) 4 MG TbDL

## 2023-06-02 NOTE — H&P
Chief complaint:  Fatigue fever shortness of breath.    72-year-old man who has a history chronic fever for the past month associated with fatigue.  He underwent fairly extensive evaluation as an outpatient it was found to have a lesion on the pancreas with presume liver metastasis.  He underwent an endoscopic ultrasound for biopsy early this week.  Preliminary results from that study is consistent pancreatic adenocarcinoma with liver metastasis.  Over the past few days he has also developed increasing shortness of breath and a mild cough.  He saw our nurse practitioner in the office today who ordered a CT PE protocol of the chest.  This came back showing multi segmental clots and he was directed to the emergency department for admission.  I have done evaluation in the emergency department and his symptoms have not changed.  He has mild shortness of breath and generalized weakness.  He has not yet seen the oncologist regarding the cancer but he was scheduled I bleed to see Dr. Palomo and a couple of weeks.  Of note is the fact that he has a history of rectal cancer many years ago and was treated in the past by Dr. Newton.    Current medications reviewed    Allergies none known     Past medical history notable for rectal cancer diagnosed about 20 years ago.  He was initially treated at [facility].  Several years ago he had a metastatic recurrence in the liver and underwent resection.  He recover from that and has done well since since.  Medical problems also include hypertension hyperlipidemia and diabetes mellitus.  He also has chronic back pain and sees Dr. Jennifer guzmán.  He was scheduled for some injections in the near future.    He used to smoke an occasional cigar.  He used to drink adult beverages but does not do much of any of those now.      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  He is  lives at home with his wife.  He was fairly active until the last month or so.    Review of systems  notable for about a 10 lb weight loss over the past month.  He has had chronic fevers but no sweats.  No unusual headaches and no dysphagia.  No chest pain or and the shortness of breath only developed recently.  No edema.  No orthopnea PND.  Minimal cough and no wheezing.  Some nausea but no vomiting.  No change in bowel habits pre is prone to constipation.  No blood in the stools or melanotic stools.  No urgency frequency or dysuria.      Physical exam:  Temperature at time of admission was 99.55 he currently feels somewhat warmer.  Blood pressure 1 30-83 and heart rate of 105.  General appearance is that of a elderly stocky man in no acute distress.  He is alert and appropriate.  HEENT exam grossly unremarkable.  Neck is supple without thyromegaly or adenopathy.  Heart has a regular rate and rhythm.  Lungs clear.  Abdomen nontender.  No about splenomegaly.  Multiple abdominal wall hernias noted.  Extremities with trace pretibial edema bilaterally.  No calf tenderness.  Pulses palpable.    Laboratory studies include an abnormal H&H 9.6 and 29.0.  White count 55648.  Chemistry profile reveals a slightly low sodium at 1:30 a.m..  Blood sugar 273.  Albumin low at 2.8.  BNP troponin lactic acid levels fine.  CA 1993 days ago was 1300    EKG shows a sinus tach and also inferior Q-waves and poor R-wave progression across the precordium.  Echo shows LVH with normal systolic function and no evidence of right-sided strain.  CTA of the chest revealed clots in the right middle right upper right lower pulmonary vessels.    Assessment: 1.  Acute pulmonary emboli.  Moderate clot burden but without evidence of right heart strain.  He appears to be clinically stable     2. Apparent pancreatic cancer with liver metastasis.    3. Type 2 diabetes mellitus.      4. Hypertension.  Control    5. Hyperlipidemia on statin therapy     6. History of back pain presumably spondylosis of the thoracic spine followed by Dr. jyoti guzmán    7.  History of rectal cancer over 20 years ago with recurrence of a metastatic lesion to the liver status post resection and chemo also many years ago.      The plan is admission for anticoagulation with Lovenox.  Will plan to transition to oral medications in  approximately 36 hours.      Will consult the Oncology team    His medical problems will be monitored and addressed.  Of course the metformin we placed on hold.  Will primarily treat diabetes with insulin for the time being.

## 2023-06-02 NOTE — Clinical Note
Diagnosis: Pulmonary embolism [937375]   Future Attending Provider: GLENN GONZALES II [[id number]]   Admitting Provider:: GLENN GONZALES II [[id number]]  
sacral

## 2023-06-02 NOTE — FIRST PROVIDER EVALUATION
Medical screening examination initiated.  I have conducted a focused provider triage encounter, findings are as follows:    Brief history of present illness:  73 y/o male presents with sob that is progressively not improving over couple weeks. Had outpatient CT which shows PE right side. Also recent pancreatic cancer dx with mets to liver.     There were no vitals filed for this visit.    Pertinent physical exam:  alert, nonlabored currently in wheel chair    Brief workup plan:  ekg, labs, imaging    Preliminary workup initiated; this workup will be continued and followed by the physician or advanced practice provider that is assigned to the patient when roomed.

## 2023-06-03 NOTE — NURSING
Nurses Note -- 4 Eyes      6/2/2023   11:27 PM      Skin assessed during: Admit      [x] No Altered Skin Integrity Present    []Prevention Measures Documented      [] Yes- Altered Skin Integrity Present or Discovered   [] LDA Added if Not in Epic (Describe Wound)   [] New Altered Skin Integrity was Present on Admit and Documented in LDA   [] Wound Image Taken    Wound Care Consulted? No    Attending Nurse:  Linnette Haque LPN     Second RN/Staff Member:  Arnie East RN       Return to clinic in 10-14 days.  Call [phone] to schedule or if you experience any problems before your scheduled appointment.      See general discharge instruction sheet for knees  1. Do exercises at home as instructed by therapist twice a day. Continue outpatient therapy as ordered by your doctor.  2. Ice knee after exercises and therapy.  3. Examine dressing daily for problems.  4. May shower, can get dressing wet, no soaking (no bathtubs, pools or hot tubs)  5. Notify your dentist or physician of your implant so you can get antibiotics before any dental work or before any invasive procedure (ie: colonoscopy)  6. Remove anti-embolism stockings (Mateusz hose) for 30 minutes twice daily.      Aquacel dressing:  DO NOT CHANGE DRESSING DAILY.  Leave this dressing on until follow-up appointment with Orthopedic Surgeon.  Dressing is waterproof, can shower with it on, pat dry when done.  No soaking such as in tub baths, pools or hot tubs        While on narcotic pain medication, to prevent constipation:  1. Drink plenty of water to keep well hydrated   2. May take over the counter Colace or Senna (follow instructions on label)        Call your physician if: ([phone])   1. Persistent fever greater than 100 degrees with body chills or excessive sweating.  2. Increased/persistent redness, localized warmth, tenderness, drainage or swelling at incision site. Greater than 50% drainage on dressing.   3. Persistent pain not controlled with oral pain medications, ice and rest.   4. No bowel movement in 3 days (may use Milk of Magnesia or other over the counter remedy).  5. Chest pain, shortness of breath, and/or calf pain with excessive swelling.  6. Generalized feeling of illness.  7. Any other questions or concerns related to your surgery/recovery.    Thank you for allowing [facility] to participate in your cares!!

## 2023-06-03 NOTE — CONSULTS
[facility] - Oncology Acute  Hematology/Oncology  Progress Note      Consult Requested By: Jimmy Zamorano II, MD    Reason for Consult:     SUBJECTIVE:     History of Present Illness:  Patient is a 72 y.o. male with history of rectal cancer diagnosed about 20 years ago.  He had surgery but no chemotherapy or radiation therapy.  He did well for several years when he developed metastatic disease to the liver.  He underwent chemotherapy and underwent resection of the liver lesion and on remission of his disease since 2007.     Patient was followed at [facility] Dr. Newton treat him locally.  He has not seen Dr. Newton for many years but will like to re establish care with him.    Patient was evaluated for weight loss and decreased appetite and on  5/18/2023   CT scan of his abdomen showed a focal hypoenhancing solid lesion at the posterior junction of the body and tail of the pancreas, measuring 2.2 x 4.5 cm, most likely a primary pancreatic malignancy although metastases also a possibility.  Chronic changes of partial resection of the left hepatic lobe with residual liver normal in size and contour however numerous solid hypodense lesions scattered through the residual liver.  Largest lesion at the posterior right hepatic lobe measuring 2.8 x 2.5 cm.  Several abnormal appearing mildly enlarged upper abdominal lymph nodes, largest measuring 1.5 x 1.9 cm. It also showed 2 mildly irregular abnormal appearing lymph node in the right epicardial fat pad with the larger measuring 1.7 x 1.2 cm.      Patient called [facility] on 05/22/2023 with concerns of weight loss, decreased appetite and anemia to schedule an appointment to see them    On 05/31/2023 patient underwent upper EUS with Dr. Rocky man.  It revealed erosive gastropathy with no bleeding and no stigmata of recent bleeding.  few rounded lesions identified in the right lobe of the liver with appearance suggestive of metastases.  Fine-needle biopsy was  obtained.  It also revealed a round mass in the pancreatic body and pancreatic tail measuring 25 mm and 27 mm.  Sonographic evidence suggesting invasion into the splenic artery.  Biopsy was obtained.  Lymphadenopathy seen.  It was stage T3 N0 M1 endosonographic criteria.    Pathology report revealed poorly differentiated adeno carcinoma of the pancreas and liver lesion.  Of note:  Immunophenotype and morphology militate against colorectal primary and morphology of pancreatic tissue in liver lesions are similar suggesting pancreatic primary with metastases to the liver. Elevated CA 19-9    Over the last few day patient developed shortness of breath so CTA lung was performed that showed right-sided pulmonary emboli, mild-to-moderate clot burden with enlarged epicardial lymph node, hepatic metastases and pancreatic body/tail junction mass.  Patient was instructed to go to the emergency department.  Upon Evaluation WBCs 13.79, HGB 9.6, MCV 89.8, platelets 299 K.  Sodium 130, chloride 91, glucose 273, albumin 2.8.    Patient is seen in rounds this morning.  Wife and 2 daughters at bedside.  Of note was of his daughter were treated with breast cancer at age of 40 at [facility].  Patient looks good.  T-max was 100.3° he reports fatigue and tiredness.  About 10 lb weight loss, decreased appetite taste.  Chest pain, shortness of breath has improved..  He is currently on Lovenox therapeutic doses for his pulmonary embolism       Continuous Infusions:  Scheduled Meds:   amLODIPine  2.5 mg Oral Daily    enoxparin  1 mg/kg Subcutaneous Q12H (treatment, non-standard time)    famotidine  20 mg Oral BID    polyethylene glycol  17 g Oral Daily    pravastatin  40 mg Oral QHS     PRN Meds:acetaminophen, ALPRAZolam, dextrose 10%, dextrose 10%, glucagon (human recombinant), glucose, glucose, insulin aspart U-100, melatonin, morphine, morphine, ondansetron, ondansetron, ondansetron, sodium chloride 0.9%, zolpidem    Past Medical  History:   Diagnosis Date    Adenocarcinoma of rectum     Diabetes mellitus, type 2     Gastrointestinal tract imaging abnormality     Hyperlipidemia     Hypertension     Lesion of liver     Other intra-abdominal and pelvic swelling, mass and lump     Personal history of colonic polyps      Past Surgical History:   Procedure Laterality Date    COLON RESECTION      COLONOSCOPY  02/08/2016    EGD, WITH CLOSED BIOPSY  05/31/2023    Procedure: EGD, WITH CLOSED BIOPSY;  Surgeon: Manpreet Jorgensen MD;  Location: [facility] OR;  Service: Gastroenterology;;    ENDOSCOPY  05/31/2023    with U/S    LIVER RESECTION  2007    LIVER SURGERY      thumb surgery Left     ULTRASOUND, ENDOSCOPIC, WITH FINE NEEDLE ASPIRATION N/A 05/31/2023    Procedure: UPPER EUS W/ POSS FNA;  Surgeon: Manpreet Jorgensen MD;  Location: [facility] OR;  Service: Gastroenterology;  Laterality: N/A;  Hx ofrectal CA s/p resection. Partial resection of the left hepatic lobe. Numerous lesion in theresidual liver up to 2.8cm. 4.5cm lesion in distal BOP/TOP. 2cm portal LN. LABS W/ PROCEDURE     Family History   Problem Relation Age of Onset    Lung cancer Mother     Lung cancer Father     No Known Problems Sister     No Known Problems Brother      Social History     Tobacco Use    Smoking status: Former    Smokeless tobacco: Never   Substance Use Topics    Alcohol use: Never     Comment: Ocassionally    Drug use: Never       Review of patient's allergies indicates:  No Known Allergies   Current Facility-Administered Medications on File Prior to Encounter   Medication Dose Route Frequency Provider Last Rate Last Admin    [COMPLETED] iohexoL (OMNIPAQUE 350) injection 75 mL  75 mL Intravenous ONCE PRN Lanie Longoria NP   75 mL at 06/02/23 1143     Current Outpatient Medications on File Prior to Encounter   Medication Sig Dispense Refill    ALPRAZolam (XANAX) 0.5 MG tablet Take 1 tablet (0.5 mg total) by mouth nightly as needed for Anxiety. 30 tablet 0    dulaglutide (TRULICITY)  1.5 mg/0.5 mL pen injector INJECT 0.5ML SUBCUTANEOUS WEEKLY 4 pen 3    felodipine (PLENDIL) 5 MG 24 hr tablet TAKE ONE TABLET BY MOUTH EVERY DAY 90 tablet 3    ferric maltol 30 mg Cap Take 1 each by mouth 2 (two) times a day. 60 capsule 3    glyBURIDE (DIABETA) 5 MG tablet Take 2 tablets (10 mg total) by mouth daily with breakfast. 180 tablet 3    hydroCHLOROthiazide (HYDRODIURIL) 12.5 MG Tab TAKE ONE TABLET BY MOUTH EVERY DAY 90 tablet 1    meloxicam (MOBIC) 15 MG tablet Take 15 mg by mouth.      metFORMIN (GLUCOPHAGE-XR) 500 MG ER 24hr tablet Take 2 tablets (1,000 mg total) by mouth once daily. 60 tablet 3    moxifloxacin (VIGAMOX) 0.5 % ophthalmic solution Place into both eyes.      ondansetron (ZOFRAN-ODT) 4 MG TbDL Take 1 tablet (4 mg total) by mouth every 8 (eight) hours as needed (nausea). 1 tablet 0    pioglitazone (ACTOS) 30 MG tablet Take 1 tablet (30 mg total) by mouth once daily. 90 tablet 3    prednisoLONE acetate (PRED FORTE) 1 % DrpS Place into both eyes.      PROLENSA 0.07 % Drop SMARTSIG:In Eye(s)      simvastatin (ZOCOR) 40 MG tablet TAKE ONE TABLET BY MOUTH AT BEDTIME 90 tablet 2       Review of Systems   Constitutional:  Positive for appetite change, fatigue and unexpected weight change. Negative for activity change, chills, diaphoresis and fever.   HENT:  Negative for nasal congestion, mouth sores, nosebleeds, sinus pressure/congestion, sore throat and trouble swallowing.    Eyes: Negative.    Respiratory:  Negative for cough and shortness of breath.    Cardiovascular:  Negative for chest pain and palpitations.   Gastrointestinal:  Negative for abdominal distention, abdominal pain, blood in stool, change in bowel habit, constipation, diarrhea, nausea, vomiting and change in bowel habit.   Endocrine: Negative.    Genitourinary:  Negative for bladder incontinence, decreased urine volume, difficulty urinating, dysuria, frequency, hematuria and urgency.   Musculoskeletal:  Negative for arthralgias,  back pain, gait problem, joint swelling, leg pain and myalgias.   Integumentary:  Negative for rash.   Allergic/Immunologic: Negative.    Neurological:  Negative for dizziness, tremors, syncope, weakness, light-headedness, numbness, headaches and memory loss.   Hematological:  Negative for adenopathy. Does not bruise/bleed easily.   Psychiatric/Behavioral:  Negative for agitation, confusion, hallucinations, sleep disturbance and suicidal ideas. The patient is not nervous/anxious.        OBJECTIVE:     Vital Signs (Most Recent)  Temp: 99.5 °F (37.5 °C) (06/03/23 0736)  Pulse: 86 (06/03/23 0736)  Resp: 20 (06/03/23 0351)  BP: 124/70 (06/03/23 0736)  SpO2: 95 % (06/03/23 0351)    Pain Assessment: No pain reported at this time    Vital Signs Range (Last 24H):  Temp:  [99.3 °F (37.4 °C)-100.3 °F (37.9 °C)]   Pulse:  []   Resp:  [16-32]   BP: (124-164)/()   SpO2:  [94 %-98 %]     Physical Exam:  Physical Exam  Vitals and nursing note reviewed.   Constitutional:       Appearance: Normal appearance.   HENT:      Head: Normocephalic and atraumatic.   Eyes:      Extraocular Movements: Extraocular movements intact.   Pulmonary:      Effort: Pulmonary effort is normal.   Musculoskeletal:      Cervical back: Neck supple.   Neurological:      Mental Status: He is alert and oriented to person, place, and time.   Psychiatric:         Mood and Affect: Mood normal.         Behavior: Behavior normal.         Judgment: Judgment normal.       Laboratory:  CBC with Differential:  Recent Labs   Lab 06/03/23 0254   WBC 11.59*   RBC 2.97*   HCT 26.9*   HGB 8.7*   MCV 90.6   MCH 29.3   RDW 13.6      MPV 8.9     CMP:  Recent Labs   Lab 06/03/23 0254   CALCIUM 8.8   ALBUMIN 2.5*   *   K 3.3*   CO2 28   BUN 10.3   CREATININE 0.88   ALKPHOS 77   ALT 15   AST 19   BILITOT 0.7     BMP:   Recent Labs   Lab 06/03/23 0254   CALCIUM 8.8   *   K 3.3*   CO2 28   BUN 10.3   CREATININE 0.88     LFTs:   Recent Labs   Lab  06/03/23  0254   ALT 15   AST 19   ALKPHOS 77   BILITOT 0.7   ALBUMIN 2.5*      Latest Reference Range & Units 05/31/23 07:04   CA 19-9 0.00 - 37.00 unit/mL 1,358.86 (H)   (H): Data is abnormally high  Coagulation:   Recent Labs   Lab 06/02/23  1310   INR 1.28     Specimen (24h ago, onward)      None          Microbiology Results (last 7 days)       Procedure Component Value Units Date/Time    Blood culture #1 **CANNOT BE ORDERED STAT** [[id number]] Collected: 06/02/23 1305    Order Status: Resulted Specimen: Blood Updated: 06/02/23 1313    Blood culture #2 **CANNOT BE ORDERED STAT** [[id number]] Collected: 06/02/23 1305    Order Status: Resulted Specimen: Blood Updated: 06/02/23 1313            Diagnostic Results:  Imaging Results    None             ASSESSMENT/PLAN:     Patient Active Problem List   Diagnosis    Adenocarcinoma of rectum    Diabetes mellitus    Hypercholesterolemia    Hypertension    History of colonic polyps    Obesity    Tobacco user    Lesion of liver    Multiple subsegmental pulmonary emboli without acute cor pulmonale    Pancreatic cancer metastasized to liver   1) Stage IV Pancreatic Cancer  --Liver metastasis  --Elevated CA 19-9  2) H/o Rectal cancer about 20 yrs ago  --s/p resection--no adjuvant Tx  --H/o mets to liver from rectal CA--s/p chemotherapy, s/p resection  of liver met and in remission of rectal CA since at least 2007    Patient was followed at [facility] but Dr. Newton treat him locally.  He has not seen Dr. Newton for 10 years but will like to re establish care with him..  Discussed briefly his diagnosis of stage IV pancreatic cancer, prognosis and treatment recommendations.  Discussed briefly chemotherapy, risks versus benefits as well as toxicities associated with it.    Plan  Continue present treatment  Continue anticoagulation  He will need MediPort placement for systemic therapy--can be done in outpatient basis  I will let Dr. Newton know about patient.   Patient most likely will  be discharged Monday if that is the case please let him go after Dr. Newton sees him.    Thank you for the consult  I will be available for any questions    Akilah Sheppard MD  Hematology/Oncology  [facility]

## 2023-06-03 NOTE — PROGRESS NOTES
Subjective:  The patient feels better overall.  He is breathing easily.  He did walk in the room without difficulty.  His appetite is poor.  No chest pain.    Objective:  T-max overnight 100.3.  Currently 98.7.  Blood pressure 146/71.  Heart rate currently 111 sitting in a chair.  It was somewhat better earlier.  O2 sat room air 96%.    General appearance is unremarkable.  He is sitting up and breathing comfortably.  Heart has a regular rhythm with a rate is a bit fast.  Lungs clear.  Abdomen nontender.  Extremities without clubbing cyanosis or edema.    Today's laboratory studies included abnormal CBC with an H&H of 8.7 and 26.9.  [place] [address].  Chemistry profile notable for improved blood sugar 188.  Potassium has drifted down to 3.3.  Albumin 2.5.    Assessment:  1. Acute pulmonary emboli.  Clinically doing well.  He does remain mildly tachycardic but otherwise asymptomatic     2. Pancreatic cancer with liver metastasis    3. Diabetes mellitus.  Improving control    4. Hypertension.  A bit elevated today     5. Anemia likely of chronic disease.    Plan: Continue current treatment.  Likely transitioned to oral medications tomorrow.  Will monitor blood pressure and adjust meds if necessary.  Repeat blood work in the morning.

## 2023-06-04 NOTE — PROGRESS NOTES
[facility] - Oncology Acute  Hematology/Oncology  Progress Note      Consult Requested By: Jimmy Zamorano II, MD    Reason for Consult:     SUBJECTIVE:     History of Present Illness:  Patient is a 72 y.o. male with history of rectal cancer diagnosed about 20 years ago.  He had surgery but no chemotherapy or radiation therapy.  He did well for several years when he developed metastatic disease to the liver.  He underwent chemotherapy and underwent resection of the liver lesion and on remission of his disease since 2007.     Patient was followed at [facility] Dr. Newton treat him locally.  He has not seen Dr. Newton for many years but will like to re establish care with him.    Patient was evaluated for weight loss and decreased appetite and on  5/18/2023   CT scan of his abdomen showed a focal hypoenhancing solid lesion at the posterior junction of the body and tail of the pancreas, measuring 2.2 x 4.5 cm, most likely a primary pancreatic malignancy although metastases also a possibility.  Chronic changes of partial resection of the left hepatic lobe with residual liver normal in size and contour however numerous solid hypodense lesions scattered through the residual liver.  Largest lesion at the posterior right hepatic lobe measuring 2.8 x 2.5 cm.  Several abnormal appearing mildly enlarged upper abdominal lymph nodes, largest measuring 1.5 x 1.9 cm. It also showed 2 mildly irregular abnormal appearing lymph node in the right epicardial fat pad with the larger measuring 1.7 x 1.2 cm.      Patient called [facility] on 05/22/2023 with concerns of weight loss, decreased appetite and anemia to schedule an appointment to see them    On 05/31/2023 patient underwent upper EUS with Dr. Rocky man.  It revealed erosive gastropathy with no bleeding and no stigmata of recent bleeding.  few rounded lesions identified in the right lobe of the liver with appearance suggestive of metastases.  Fine-needle biopsy was  obtained.  It also revealed a round mass in the pancreatic body and pancreatic tail measuring 25 mm and 27 mm.  Sonographic evidence suggesting invasion into the splenic artery.  Biopsy was obtained.  Lymphadenopathy seen.  It was stage T3 N0 M1 endosonographic criteria.    Pathology report revealed poorly differentiated adeno carcinoma of the pancreas and liver lesion.  Of note:  Immunophenotype and morphology militate against colorectal primary and morphology of pancreatic tissue in liver lesions are similar suggesting pancreatic primary with metastases to the liver. Elevated CA 19-9    Over the last few day patient developed shortness of breath so CTA lung was performed that showed right-sided pulmonary emboli, mild-to-moderate clot burden with enlarged epicardial lymph node, hepatic metastases and pancreatic body/tail junction mass.  Patient was instructed to go to the emergency department.  Upon Evaluation WBCs 13.79, HGB 9.6, MCV 89.8, platelets 299 K.  Sodium 130, chloride 91, glucose 273, albumin 2.8.    Patient is seen in rounds this morning.  Wife and 2 daughters at bedside.  Of note was of his daughter were treated with breast cancer at age of 40 at [facility].  Patient looks good.  T-max was 100.3° he reports fatigue and tiredness.  About 10 lb weight loss, decreased appetite taste.  Chest pain, shortness of breath has improved..  He is currently on Lovenox therapeutic doses for his pulmonary embolism    06/04/2023 Patient is seen in rounds this morning.  Daughter at bedside.  Patient reports that has the episodes of rectal bleeding this morning, about a tsp of bright red blood.  He does report that he was constipated prior to this bowel movement.  Patient with leukocytosis, blood culture so far negative. T max 101. Clinically looks good  He has an appt with Dr Maximo Alejandro 6/8/23.       Continuous Infusions:  Scheduled Meds:   amLODIPine  2.5 mg Oral Daily    apixaban  10 mg Oral BID    famotidine  20  mg Oral BID    glyBURIDE  10 mg Oral Daily with breakfast    polyethylene glycol  17 g Oral Daily    pravastatin  40 mg Oral QHS     PRN Meds:acetaminophen, ALPRAZolam, dextrose 10%, dextrose 10%, glucagon (human recombinant), glucose, glucose, insulin aspart U-100, melatonin, morphine, morphine, ondansetron, ondansetron, ondansetron, sodium chloride 0.9%, zolpidem    Past Medical History:   Diagnosis Date    Adenocarcinoma of rectum     Diabetes mellitus, type 2     Gastrointestinal tract imaging abnormality     Hyperlipidemia     Hypertension     Lesion of liver     Other intra-abdominal and pelvic swelling, mass and lump     Personal history of colonic polyps      Past Surgical History:   Procedure Laterality Date    COLON RESECTION      COLONOSCOPY  02/08/2016    EGD, WITH CLOSED BIOPSY  05/31/2023    Procedure: EGD, WITH CLOSED BIOPSY;  Surgeon: Manpreet Jorgensen MD;  Location: [facility] OR;  Service: Gastroenterology;;    ENDOSCOPY  05/31/2023    with U/S    LIVER RESECTION  2007    LIVER SURGERY      thumb surgery Left     ULTRASOUND, ENDOSCOPIC, WITH FINE NEEDLE ASPIRATION N/A 05/31/2023    Procedure: UPPER EUS W/ POSS FNA;  Surgeon: Manpreet Jorgensen MD;  Location: [facility] OR;  Service: Gastroenterology;  Laterality: N/A;  Hx ofrectal CA s/p resection. Partial resection of the left hepatic lobe. Numerous lesion in theresidual liver up to 2.8cm. 4.5cm lesion in distal BOP/TOP. 2cm portal LN. LABS W/ PROCEDURE     Family History   Problem Relation Age of Onset    Lung cancer Mother     Lung cancer Father     No Known Problems Sister     No Known Problems Brother      Social History     Tobacco Use    Smoking status: Former    Smokeless tobacco: Never   Substance Use Topics    Alcohol use: Never     Comment: Ocassionally    Drug use: Never       Review of patient's allergies indicates:  No Known Allergies   No current facility-administered medications on file prior to encounter.     Current Outpatient Medications on File  Prior to Encounter   Medication Sig Dispense Refill    ALPRAZolam (XANAX) 0.5 MG tablet Take 1 tablet (0.5 mg total) by mouth nightly as needed for Anxiety. 30 tablet 0    dulaglutide (TRULICITY) 1.5 mg/0.5 mL pen injector INJECT 0.5ML SUBCUTANEOUS WEEKLY 4 pen 3    felodipine (PLENDIL) 5 MG 24 hr tablet TAKE ONE TABLET BY MOUTH EVERY DAY 90 tablet 3    ferric maltol 30 mg Cap Take 1 each by mouth 2 (two) times a day. 60 capsule 3    glyBURIDE (DIABETA) 5 MG tablet Take 2 tablets (10 mg total) by mouth daily with breakfast. 180 tablet 3    hydroCHLOROthiazide (HYDRODIURIL) 12.5 MG Tab TAKE ONE TABLET BY MOUTH EVERY DAY 90 tablet 1    meloxicam (MOBIC) 15 MG tablet Take 15 mg by mouth.      metFORMIN (GLUCOPHAGE-XR) 500 MG ER 24hr tablet Take 2 tablets (1,000 mg total) by mouth once daily. 60 tablet 3    moxifloxacin (VIGAMOX) 0.5 % ophthalmic solution Place into both eyes.      ondansetron (ZOFRAN-ODT) 4 MG TbDL Take 1 tablet (4 mg total) by mouth every 8 (eight) hours as needed (nausea). 1 tablet 0    pioglitazone (ACTOS) 30 MG tablet Take 1 tablet (30 mg total) by mouth once daily. 90 tablet 3    prednisoLONE acetate (PRED FORTE) 1 % DrpS Place into both eyes.      PROLENSA 0.07 % Drop SMARTSIG:In Eye(s)      simvastatin (ZOCOR) 40 MG tablet TAKE ONE TABLET BY MOUTH AT BEDTIME 90 tablet 2       Review of Systems   Constitutional:  Positive for appetite change, fatigue and unexpected weight change. Negative for activity change, chills, diaphoresis and fever.   HENT:  Negative for nasal congestion, mouth sores, nosebleeds, sinus pressure/congestion, sore throat and trouble swallowing.    Eyes: Negative.    Respiratory:  Negative for cough and shortness of breath.    Cardiovascular:  Negative for chest pain and palpitations.   Gastrointestinal:  Negative for abdominal distention, abdominal pain, blood in stool, change in bowel habit, constipation, diarrhea, nausea, vomiting and change in bowel habit.   Endocrine:  Negative.    Genitourinary:  Negative for bladder incontinence, decreased urine volume, difficulty urinating, dysuria, frequency, hematuria and urgency.   Musculoskeletal:  Negative for arthralgias, back pain, gait problem, joint swelling, leg pain and myalgias.   Integumentary:  Negative for rash.   Allergic/Immunologic: Negative.    Neurological:  Negative for dizziness, tremors, syncope, weakness, light-headedness, numbness, headaches and memory loss.   Hematological:  Negative for adenopathy. Does not bruise/bleed easily.   Psychiatric/Behavioral:  Negative for agitation, confusion, hallucinations, sleep disturbance and suicidal ideas. The patient is not nervous/anxious.        OBJECTIVE:     Vital Signs (Most Recent)  Temp: 98.8 °F (37.1 °C) (06/04/23 1149)  Pulse: 98 (06/04/23 1149)  Resp: 18 (06/04/23 1149)  BP: 131/77 (06/04/23 1149)  SpO2: 95 % (06/04/23 1149)    Pain Assessment: No pain reported at this time    Vital Signs Range (Last 24H):  Temp:  [97.9 °F (36.6 °C)-101 °F (38.3 °C)]   Pulse:  []   Resp:  [18-19]   BP: (131-146)/(71-77)   SpO2:  [92 %-100 %]     Physical Exam:  Physical Exam  Vitals and nursing note reviewed.   Constitutional:       Appearance: Normal appearance.   HENT:      Head: Normocephalic and atraumatic.   Eyes:      Extraocular Movements: Extraocular movements intact.   Pulmonary:      Effort: Pulmonary effort is normal.   Musculoskeletal:      Cervical back: Neck supple.   Neurological:      Mental Status: He is alert and oriented to person, place, and time.   Psychiatric:         Mood and Affect: Mood normal.         Behavior: Behavior normal.         Judgment: Judgment normal.       Laboratory:  CBC with Differential:  Recent Labs   Lab 06/04/23  0336   WBC 14.18*   RBC 3.15*   HCT 29.3*   HGB 9.3*   MCV 93.0   MCH 29.5   RDW 13.9      MPV 9.0       CMP:  Recent Labs   Lab 06/04/23  0336   CALCIUM 8.9   ALBUMIN 2.7*      K 3.4*   CO2 29   BUN 9.8   CREATININE  0.81   ALKPHOS 84   ALT 17   AST 21   BILITOT 0.8       BMP:   Recent Labs   Lab 06/04/23  0336   CALCIUM 8.9      K 3.4*   CO2 29   BUN 9.8   CREATININE 0.81       LFTs:   Recent Labs   Lab 06/04/23  0336   ALT 17   AST 21   ALKPHOS 84   BILITOT 0.8   ALBUMIN 2.7*        Latest Reference Range & Units 05/31/23 07:04   CA 19-9 0.00 - 37.00 unit/mL 1,358.86 (H)   (H): Data is abnormally high  Coagulation:   No results for input(s): PT, INR, APTT in the last 24 hours.    Specimen (24h ago, onward)      None          Microbiology Results (last 7 days)       Procedure Component Value Units Date/Time    Blood culture #1 **CANNOT BE ORDERED STAT** [[id number]]  (Normal) Collected: 06/02/23 1305    Order Status: Completed Specimen: Blood Updated: 06/03/23 1400     CULTURE, BLOOD ([facility]) No Growth At 24 Hours    Blood culture #2 **CANNOT BE ORDERED STAT** [[id number]]  (Normal) Collected: 06/02/23 1305    Order Status: Completed Specimen: Blood Updated: 06/03/23 1400     CULTURE, BLOOD ([facility]) No Growth At 24 Hours            Diagnostic Results:  Imaging Results    None             ASSESSMENT/PLAN:     Patient Active Problem List   Diagnosis    Adenocarcinoma of rectum    Diabetes mellitus    Hypercholesterolemia    Hypertension    History of colonic polyps    Obesity    Tobacco user    Lesion of liver    Multiple subsegmental pulmonary emboli without acute cor pulmonale    Pancreatic cancer metastasized to liver   1) Stage IV Pancreatic Cancer  --Liver metastasis  --Elevated CA 19-9  2) H/o Rectal cancer about 20 yrs ago  --s/p resection--no adjuvant Tx  --H/o mets to liver from rectal CA--s/p chemotherapy, s/p resection  of liver met and in remission of rectal CA since at least 2007  --Patient was followed at [facility] but Dr. Newton treat him locally.  He has not seen Dr. Newton for 10 years but will like to re establish care with him..  --Discussed briefly his diagnosis of stage IV pancreatic cancer, prognosis and  treatment recommendations.  Discussed briefly chemotherapy, risks versus benefits as well as toxicities associated with it.  3) Leukocytosis- WBC's trending up. B/C neg. Will cont to monitor  4) Fever   5) rectal bleeding--Patient had a brief episode of rectal bleeding this morning after moving his bowels.  No more bleeding episodes since then.  Explained to him that it could be secondary to constipation and anticoagulation instructed that if this happens again to let us know.    Plan  Continue present treatment  Continue anticoagulation  He will need MediPort placement for systemic therapy--can be done in outpatient basis. Has an appt with Dr Alejandro Thursday    Patient most likely will be discharged Monday if that is the case please let him go after Dr. Newton sees him.        Akilah Sheppard MD  Hematology/Oncology  [facility]

## 2023-06-04 NOTE — PROGRESS NOTES
Subjective:  The patient passed a small volume of bright red blood earlier today.  This occurred after taking a shower.  There was several drops of bright red blood.  There was no pain.  He has had some issues with constipation but later his bowels move uneventfully without any blood in the commode.  Otherwise he feels okay.  He is breathing easily.  Minimal cough.  No chest pain.    Objective:  He is currently afebrile but T-max overnight was 101.  Heart rate 90 respiratory rate 19 blood pressure 142/75.  General appearance is unremarkable.  Heart has a regular rhythm with slight tachycardia.  Lungs clear.  Abdomen nontender.  Extremities with trace bilateral pretibial edema.  Visualization of perianal area unremarkable.  No external hemorrhoids no masses etc..      Laboratory studies reveal a stable H&H 9.3 and 29.  White count is drifted back up to 14,000 with a left shift.  Chemistry profile reveals stability with a slightly low potassium but normal BUN creatinine and glucose of 126.     Assessment:  1. Acute pulmonary emboli.  Overall doing well    2. Recent diagnosis of pancreatic cancer with liver metastasis     3. Bright red blood per rectum earlier today.  No obvious source of bleeding.  He was due for next colonoscopy in August of this year.  Understand referral to Dr. Hidalgo with was made in the office last week.    4. Diabetes mellitus.  Stable     5. Hypertension.  Stable     6. Anemia of chronic disease.      7. Fever.  Likely tumor fever, although the white count going up as a bit worrisome    Plan:  Will transition to Eliquis.  We will continue to monitoring rectal bleeding.  Will update blood work tomorrow.  If the bleeding continues then will have to get GI to see him here for endoscopy.  Otherwise it would be done as an outpatient.

## 2023-06-05 NOTE — DISCHARGE SUMMARY
[facility] - [facility] Medicine  Discharge Summary      Patient Name: Ace Gomes  MRN: [id number]  Admission Date: 6/2/2023  Hospital Length of Stay: 3 days  Discharge Date and Time: No discharge date for patient encounter.  Attending Physician: Glenn Zamorano II, MD   Discharging Provider: GLENN ZAMORANO MD  Primary Care Provider: GLENN ZAMORANO MD        HPI:   72-year-old man who has a history chronic fever for the past month associated with fatigue.  He underwent fairly extensive evaluation as an outpatient it was found to have a lesion on the pancreas with presume liver metastasis.  He underwent an endoscopic ultrasound for biopsy early this week.  Preliminary results from that study is consistent pancreatic adenocarcinoma with liver metastasis.  Over the past few days he has also developed increasing shortness of breath and a mild cough.  He saw our nurse practitioner in the office today who ordered a CT PE protocol of the chest.  This came back showing multi segmental clots and he was directed to the emergency department for admission.  I have done evaluation in the emergency department and his symptoms have not changed.  He has mild shortness of breath and generalized weakness.  He has not yet seen the oncologist regarding the cancer but he was scheduled I bleed to see Dr. Palomo and a couple of weeks.  Of note is the fact that he has a history of rectal cancer many years ago and was treated in the past by Dr. Newton.    * No surgery found *      Hospital Course:  Patient patient was admitted with the above shortness of breath showed bilateral PE was placed on Eliquis PE protocol.  Ultrasound lower extremities did reveal bilateral lower extremity DVT.  Likely as result of hypercoagulability of malignancy.  He was seen by his oncologist Dr. Newton while in the hospital.  He symptomatically improved to a point where he does not need oxygen.  He feels much  improved his labs remained stable does have a history of anemia of chronic disease.  Will send him home with Eliquis 10 mg b.i.d. for the next 10 days and then this will change him to 5 mg b.i.d..  Does have an appointment for MediPort placement in the near future keep all other appointments he was stable and improved ready for discharge.    Consults:   Consults (From admission, onward)          Status Ordering Provider     Inpatient consult to Oncology  Once        Provider:  Bhaskar Sanabria II, MD    Completed ESTHER DACOSTA            Final Active Diagnoses:    Diagnosis Date Noted POA    PRINCIPAL PROBLEM:  Multiple subsegmental pulmonary emboli without acute cor pulmonale [I26.94] 06/02/2023 Unknown    Pancreatic cancer metastasized to liver [C25.9, C78.7] 06/02/2023 Unknown    Diabetes mellitus [E11.9] 07/25/2022 Yes    Hypercholesterolemia [E78.00] 07/25/2022 Yes    Hypertension [I10] 07/25/2022 Yes      Problems Resolved During this Admission:      Discharged Condition: stable    Disposition: Home or Self Care    Follow Up:    Patient Instructions:   No discharge procedures on file.  Medications:  Reconciled Home Medications:      Medication List        START taking these medications      * apixaban 5 mg Tab  Commonly known as: ELIQUIS  Take 2 tablets (10 mg total) by mouth 2 (two) times daily. for 10 days     * apixaban 5 mg Tab  Commonly known as: ELIQUIS  Take 1 tablet (5 mg total) by mouth 2 (two) times daily.           * This list has 2 medication(s) that are the same as other medications prescribed for you. Read the directions carefully, and ask your doctor or other care provider to review them with you.                CONTINUE taking these medications      ALPRAZolam 0.5 MG tablet  Commonly known as: XANAX  Take 1 tablet (0.5 mg total) by mouth nightly as needed for Anxiety.     felodipine 5 MG 24 hr tablet  Commonly known as: PLENDIL  TAKE ONE TABLET BY MOUTH EVERY DAY     glyBURIDE 5 MG  tablet  Commonly known as: DIABETA  Take 2 tablets (10 mg total) by mouth daily with breakfast.     hydroCHLOROthiazide 12.5 MG Tab  Commonly known as: HYDRODIURIL  TAKE ONE TABLET BY MOUTH EVERY DAY     meloxicam 15 MG tablet  Commonly known as: MOBIC  Take 15 mg by mouth.     metFORMIN 500 MG ER 24hr tablet  Commonly known as: GLUCOPHAGE-XR  Take 2 tablets (1,000 mg total) by mouth once daily.     moxifloxacin 0.5 % ophthalmic solution  Commonly known as: VIGAMOX  Place into both eyes.     ondansetron 4 MG Tbdl  Commonly known as: ZOFRAN-ODT  Take 1 tablet (4 mg total) by mouth every 8 (eight) hours as needed (nausea).     pioglitazone 30 MG tablet  Commonly known as: ACTOS  Take 1 tablet (30 mg total) by mouth once daily.     prednisoLONE acetate 1 % Drps  Commonly known as: PRED FORTE  Place into both eyes.     PROLENSA 0.07 % Drop  Generic drug: bromfenac  SMARTSIG:In Eye(s)     simvastatin 40 MG tablet  Commonly known as: ZOCOR  TAKE ONE TABLET BY MOUTH AT BEDTIME     TRULICITY 1.5 mg/0.5 mL pen injector  Generic drug: dulaglutide  INJECT 0.5ML SUBCUTANEOUS WEEKLY            STOP taking these medications      ferric maltol 30 mg Cap              Significant Diagnostic Studies: N/A    Pending Diagnostic Studies:       None          Indwelling Lines/Drains at time of discharge:   Lines/Drains/Airways       None                   Time spent on the discharge of patient: 33 minutes .18 minutes with patient 15 minutes in the chart         GLENN GONZALES MD  Department of Hospital Medicine  [facility] - 4th Floor Medical Telemetry

## 2023-06-05 NOTE — PROGRESS NOTES
Inpatient Nutrition Evaluation    Admit Date: 6/2/2023   Total duration of encounter: 2 days    Nutrition Recommendation/Prescription     Diabetic diet as tolerated.    Boost Max BID. 160 kcals and 30 g protein per serving.     Monitor for need for appetite stimulant. Consider dronabinol for fast action.    Monitor po intake, weight, labs.     Nutrition Assessment     Malnutrition in the context of acute illness or injury  Degree of Malnutrition: non-severe (moderate) malnutrition  Energy Intake: </= 50% of estimated energy requirement for >/= 5 days  Interpretation of Weight Loss: >2% in 1 week  Body Fat:mild depletion  Area of Body Fat Loss: orbital region   Muscle Mass Loss: mild depletion  Area of Muscle Mass Loss: temple region - temporalis muscle, clavicle and acromion bone region - deltoid muscle, and dorsal hand - interosseous musle  Fluid Accumulation: does not meet criteria  Edema: does not meet criteria   Reduced  Strength: unable to obtain  A minimum of two characteristics is recommended for diagnosis of either severe or non-severe malnutrition.     Chart Review    Reason Seen: malnutrition screening tool (MST)    Malnutrition Screening Tool Results   Have you recently lost weight without trying?: Yes: 2-13 lbs  Have you been eating poorly because of a decreased appetite?: Yes   MST Score: 2     Diagnosis:  1. Acute pulmonary emboli.  Overall doing well    2. Recent diagnosis of pancreatic cancer with liver metastasis     3. Bright red blood per rectum earlier today.  No obvious source of bleeding.  He was due for next colonoscopy in August of this year.  Understand referral to Dr. Hidalgo with was made in the office last week.    4. Diabetes mellitus.  Stable     5. Hypertension.  Stable     6. Anemia of chronic disease.      7. Fever.  Likely tumor fever, although the white count going up as a bit worrisome    Relevant Medical History:    Adenocarcinoma of rectum w/ liver mets      Diabetes mellitus,  "type 2      Gastrointestinal tract imaging abnormality      Hyperlipidemia      Hypertension      Lesion of liver      Other intra-abdominal and pelvic swelling, mass and lump      Personal history of colonic polyps    H/o liver and colon resection    Nutrition-Related Medications: Insulin, Famotidine, Miralax     Nutrition-Related Labs:  6/3: Na 135, K 3.3, Cl 95, Glu 188, Alb 2.5    Diet Order: Diet Adult Regular  Oral Supplement Order: none  Appetite/Oral Intake: poor/25-50% of meals  Factors Affecting Nutritional Intake: decreased appetite  Food/Synagogue/Cultural Preferences: none reported  Food Allergies: none reported       Wound(s):   n/a    Comments    6/4/23: Pt reports progressively decreasing appetite x 4 weeks, has lost some weight unintentionally, no GI complaints currently, last BM this morning, mostly eats regular diet @ home - wife tries to limit his intake of sweets/processed foods. They had questions about diabetic diet, discussed diabetic diet but also emphasized need to support muscle mass and immune function while working around potential GI reactions to ca & ca tx, gave meal/snack ideas. Pt drinks high protein Ensure @ home equivalent to Boost Max , would like to try Boost Max.     Anthropometrics    Height: 5' 11" (180.3 cm)    Last Weight: 101.4 kg (223 lb 8.7 oz) (06/03/23 0039) Weight Method: Bed Scale  BMI (Calculated): 31.2  BMI Classification: obese grade I (BMI 30-34.9)        Ideal Body Weight (IBW), Male: 172 lb     % Ideal Body Weight, Male (lb): 130.23 %                          Usual Weight Provided By: patient  234 lbs  Wt Readings from Last 5 Encounters:   06/03/23 101.4 kg (223 lb 8.7 oz)   06/02/23 103 kg (227 lb)   05/31/23 101.7 kg (224 lb 3.3 oz)   05/19/23 106.1 kg (234 lb)   05/08/23 106.1 kg (234 lb)     Weight Change(s) Since Admission:  Admit Weight: 101.6 kg (224 lb) (06/02/23 1241)  6/4/23: no new wt.     Nutrition Needs    Energy: 0631-7237 kcals (18-23 " kcals/kg)  Protein: 101-132 g (1.0-1.3 g/kg)  Fluid: 0577-5368 mL (1 mL/kcal)      Patient Education    Not applicable.    Monitoring & Evaluation     Dietitian will monitor food and beverage intake, weight, electrolyte/renal panel, glucose/endocrine profile, and gastrointestinal profile.  Nutrition Risk/Follow-Up: high (follow-up in 1-4 days)

## 2023-06-05 NOTE — SUBJECTIVE & OBJECTIVE
Interval History:  Patient was admitted over the weekend with some shortness of breath found to have bilateral PE likely source of lower extremity DVT.  Underwent ultrasound of the lower extremities today that revealed bilateral DVTs.  He is currently on Eliquis he is no longer short of breath not requiring oxygen.  He states he feels improved    Review of Systems   Constitutional: Negative.    HENT: Negative.     Eyes: Negative.    Respiratory:  Positive for shortness of breath. Negative for cough and chest tightness.    Cardiovascular:  Negative for chest pain and leg swelling.   Gastrointestinal:  Negative for abdominal pain, diarrhea, nausea and vomiting.   Endocrine: Negative.    Genitourinary: Negative.    Musculoskeletal: Negative.    Skin: Negative.    Allergic/Immunologic: Negative.    Neurological: Negative.  Negative for headaches.   Hematological: Negative.    Psychiatric/Behavioral: Negative.     Objective:     Vital Signs (Most Recent):  Temp: 98.5 °F (36.9 °C) (06/05/23 1125)  Pulse: 90 (06/05/23 1125)  Resp: 18 (06/05/23 0742)  BP: (!) 152/78 (06/05/23 1125)  SpO2: (!) 93 % (06/05/23 1125) Vital Signs (24h Range):  Temp:  [98.5 °F (36.9 °C)-101.3 °F (38.5 °C)] 98.5 °F (36.9 °C)  Pulse:  [] 90  Resp:  [18-21] 18  SpO2:  [91 %-98 %] 93 %  BP: (120-152)/(63-78) 152/78     Weight: 101.4 kg (223 lb 8.7 oz)  Body mass index is 31.18 kg/m².    Intake/Output Summary (Last 24 hours) at 6/5/2023 1731  Last data filed at 6/5/2023 0800  Gross per 24 hour   Intake 597 ml   Output 550 ml   Net 47 ml         Physical Exam  Eyes:      Pupils: Pupils are equal, round, and reactive to light.   Cardiovascular:      Rate and Rhythm: Normal rate.      Pulses: Normal pulses.      Heart sounds: No murmur heard.  Pulmonary:      Effort: Pulmonary effort is normal.      Breath sounds: Normal breath sounds.   Abdominal:      General: Abdomen is flat. Bowel sounds are normal. There is no distension.      Palpations:  Abdomen is soft.      Tenderness: There is no guarding.   Musculoskeletal:         General: Normal range of motion.      Cervical back: Normal range of motion and neck supple.   Skin:     General: Skin is warm.   Neurological:      General: No focal deficit present.      Mental Status: He is alert.   Psychiatric:         Mood and Affect: Mood normal.         Behavior: Behavior normal.           Significant Labs: All pertinent labs within the past 24 hours have been reviewed.  Recent Lab Results         06/05/23  1623   06/05/23  1128   06/05/23  0357   06/04/23  2110        Albumin/Globulin Ratio     0.6         Albumin     2.5         Alkaline Phosphatase     77         ALT     17         AST     18         Baso #     0.03         Basophil %     0.3         BILIRUBIN TOTAL     0.7         BUN     9.9         Calcium     8.6         Chloride     96         CO2     31         Creatinine     0.74         eGFR     >60         Eos #     0.07         Eosinophil %     0.6         Globulin, Total     3.9         Glucose     159         Hematocrit     26.3         Hemoglobin     8.5         Immature Grans (Abs)     0.14         Immature Granulocytes     1.2         Lymph #     1.48         LYMPH %     12.6         MCH     29.2         MCHC     32.3         MCV     90.4         Mono #     1.28         Mono %     10.9         MPV     8.8         Neut #     8.73         Neut %     74.4         nRBC     0.0         Platelets     328         POCT Glucose 253   343     294       Potassium     3.2         PROTEIN TOTAL     6.4         RBC     2.91         RDW     13.7         Sodium     139         WBC     11.73                 Significant Imaging: I have reviewed all pertinent imaging results/findings within the past 24 hours.

## 2023-06-05 NOTE — TELEPHONE ENCOUNTER
----- Message from Ivan Sanders LPN sent at 6/5/2023  8:52 AM CDT -----  Regarding: stephie spann 6/12 @3  Are there any outstanding tasks in patient chart? In hospital, keep appt for now    Is there documentation of outstanding tasks in patient chart? no    Has patient been to the ER, urgent care, or another physician since last visit?    Has patient done any blood work or x-rays since last visit?

## 2023-06-05 NOTE — ASSESSMENT & PLAN NOTE
Patient's FSGs are uncontrolled due to hyperglycemia on current medication regimen.  Last A1c reviewed-   Lab Results   Component Value Date    HGBA1C 6.7 05/08/2023     Most recent fingerstick glucose reviewed-   Recent Labs   Lab 06/04/23  2110 06/05/23  1128 06/05/23  1623   POCTGLUCOSE 294* 343* 253*     Current correctional scale  Medium  Maintain anti-hyperglycemic dose as follows-   Antihyperglycemics (From admission, onward)    Start     Stop Route Frequency Ordered    06/04/23 2132  insulin aspart U-100 injection 1-10 Units         -- SubQ Before meals & nightly PRN 06/04/23 2034 06/04/23 0745  glyBURIDE tablet 10 mg         -- Oral With breakfast 06/03/23 1834        His metformin was on hold for contrast study also glyburide was on hold as well he is on insulin sliding scale.  Will resume his home meds

## 2023-06-05 NOTE — PROGRESS NOTES
Hematology/Oncology  Progress Note      SUBJECTIVE:     History of Present Illness:  Patient is a 72 y.o. male with history of rectal cancer diagnosed about 20 years ago.  He had surgery but no chemotherapy or radiation therapy.  He did well for several years when he developed metastatic disease to the liver.  He underwent chemotherapy and underwent resection of the liver lesion and on remission of his disease since 2007. He was followed at [facility] and [facility] in [place].    Patient was evaluated for weight loss and decreased appetite and on  5/18/23. CT A/P showed a focal hypoenhancing solid lesion at the posterior junction of the body and tail of the pancreas, measuring 2.2 x 4.5 cm, most likely a primary pancreatic malignancy.  Chronic changes of partial resection of the left hepatic lobe with residual liver normal in size and contour however numerous solid hypodense lesions scattered through the residual liver.  Largest lesion at the posterior right hepatic lobe measuring 2.8 x 2.5 cm.  Several abnormal appearing mildly enlarged upper abdominal lymph nodes, largest measuring 1.5 x 1.9 cm. It also showed 2 mildly irregular abnormal appearing lymph node in the right epicardial fat pad with the larger measuring 1.7 x 1.2 cm.      Patient called [facility] on 05/22/23 with concerns of weight loss, decreased appetite and anemia to schedule an appointment to see them    EUS 5/31/23 showed erosive gastropathy with no bleeding.  Pancreatic body mass and liver Mets were seen by ultrasound.  FNA of the pancreatic mass and a liver lesion were both positive for poorly differentiated adenocarcinoma.  Immunophenotype consistent with primary pancreatic cancer and not metastatic colorectal cancer.  CA 19 9 level was elevated at 1358 U/ml.    CTA chest 6/2/23 done secondary to increased shortness of breath showed right-sided pulmonary emboli, mild-to-moderate clot burden with an enlarged epicardial node.  Body/tail mass and hepatic  metastases were again noted.  Was referred to the hospital for admission and started on full-dose Lovenox.  He was subsequently transitioned to Eliquis.    Today (6/05/23):  Patient awake and alert this morning, sitting up in a chair.  His wife is at the bedside.  He is aware of the diagnosis, scan and biopsy results.  He was scheduled as an outpatient to see Dr. Alejandro 6/8/23 regarding placement of a MediPort.  He denies any abdominal pain.  He has mild dyspnea with exertion.  Oxygen saturation is 97% on 2 L NC.  He denies any lower extremity pain or swelling.       Continuous Infusions:  Scheduled Meds:   amLODIPine  2.5 mg Oral Daily    apixaban  10 mg Oral BID    famotidine  20 mg Oral BID    glyBURIDE  10 mg Oral Daily with breakfast    polyethylene glycol  17 g Oral Daily    pravastatin  40 mg Oral QHS     PRN Meds:acetaminophen, ALPRAZolam, dextrose 10%, dextrose 10%, glucagon (human recombinant), glucose, glucose, insulin aspart U-100, melatonin, morphine, morphine, ondansetron, ondansetron, ondansetron, sodium chloride 0.9%, zolpidem      Review of Systems   Constitutional:  Positive for appetite change, fatigue and unexpected weight change. Negative for activity change and fever.   HENT:  Negative for mouth sores, nosebleeds, sore throat and trouble swallowing.    Eyes: Negative.    Respiratory:  Positive for shortness of breath. Negative for cough.    Cardiovascular:  Negative for chest pain, palpitations and leg swelling.   Gastrointestinal:  Negative for abdominal distention, abdominal pain, constipation, diarrhea, nausea and vomiting.   Genitourinary:  Negative for dysuria, frequency, hematuria and urgency.   Musculoskeletal:  Positive for neck pain. Negative for arthralgias and back pain.   Integumentary:  Negative for pallor and rash.   Neurological:  Negative for dizziness, weakness, numbness and headaches.   Hematological:  Negative for adenopathy. Does not bruise/bleed easily.    Psychiatric/Behavioral: Negative.         OBJECTIVE:     Vital Signs (Most Recent)  Temp: 98.6 °F (37 °C) (06/05/23 0742)  Pulse: 94 (06/05/23 0742)  Resp: 18 (06/05/23 0742)  BP: 137/73 (06/05/23 0742)  SpO2: 98 % (06/05/23 0742)      Physical Exam:  Physical Exam  Constitutional:       Comments: Elderly, obese white male in NAD   HENT:      Head: Normocephalic.      Mouth/Throat:      Mouth: Mucous membranes are moist.      Pharynx: Oropharynx is clear. No posterior oropharyngeal erythema.   Eyes:      Extraocular Movements: Extraocular movements intact.      Conjunctiva/sclera: Conjunctivae normal.      Pupils: Pupils are equal, round, and reactive to light.   Cardiovascular:      Rate and Rhythm: Normal rate and regular rhythm.      Heart sounds: No murmur heard.  Pulmonary:      Comments: Few crackles at the bases bilaterally.  No wheezes.  Abdominal:      General: Bowel sounds are normal. There is no distension.      Palpations: Abdomen is soft.      Tenderness: There is no abdominal tenderness.      Comments: Well-healed abdominal incision.  No palpable masses or organomegaly   Musculoskeletal:         General: No swelling or tenderness. Normal range of motion.      Cervical back: Neck supple. No tenderness.   Skin:     General: Skin is warm and dry.      Findings: No rash.   Neurological:      General: No focal deficit present.      Mental Status: He is alert and oriented to person, place, and time.      Cranial Nerves: No cranial nerve deficit.      Motor: No weakness.       Laboratory:  CBC with Differential:  Recent Labs   Lab 06/05/23  0357   WBC 11.73*   RBC 2.91*   HCT 26.3*   HGB 8.5*   MCV 90.4   MCH 29.2   RDW 13.7      MPV 8.8       CMP:  Recent Labs   Lab 06/05/23  0357   CALCIUM 8.6*   ALBUMIN 2.5*      K 3.2*   CO2 31   BUN 9.9   CREATININE 0.74   ALKPHOS 77   ALT 17   AST 18   BILITOT 0.7       Diagnostic Results:  All CT images reviewed by me personally      ASSESSMENT/PLAN:    IMPRESSION  Biopsy-proven metastatic pancreatic cancer  Acute pulmonary emboli  Fever - suspect secondary to above  Anemia  Leukocytosis - improved  Remote history of rectal cancer in remission    RECOMMENDATIONS  The diagnosis, prognosis and treatment options for metastatic pancreatic cancer were reviewed and discussed with the patient and his wife.  Order bilateral lower extremity ultrasound to rule out occult DVT secondary to documented pulmonary emboli.  Palliative chemotherapy recommended as an outpatient with a regimen of FOLFIRINOX.  Benefits, risks, toxicities and side effects of this treatment regimen were discussed and reviewed in detail. All questions were answered. Literature regarding the treatment plan and specific drug information was also provided.  He has a scheduled appointment with Surgical Oncology 6/8/23 to discuss MediPort catheter placement.  Eliquis will have to be held for MediPort placement and he will require bridging with Lovenox.  Arrange outpatient follow-up in my office for treatment and ongoing monitoring.  Evaluate for home oxygen prior to discharge.  Please call if any questions.      BARBARA ANTHONY MD  Hematology/Oncology    ADDENDUM    6/05/23  12:20PM  Venous ultrasound of the lower extremities showed a DVT in the right posterior tibial vein and left posterior tibial and peroneal veins.

## 2023-06-06 NOTE — PROGRESS NOTES
C3 nurse spoke with Ace Gomes's spouse for a TCC post hospital discharge follow up call. Pts spouse stated the pt was pretty weak last night, but today he is doing okay and doing better using a walker but his ankles are a bit swollen. Pt states she has GLENN GONZALES MD number for continued or worsening symptoms. OOC number provided. The patient has a scheduled appt with GLENN GONZALES MD on 6/12/23 at 1500 and his surgical oncologist on 6/9/23. Message routed to GLENN GONZALES MD.

## 2023-06-06 NOTE — TELEPHONE ENCOUNTER
----- Message from Caitlyn Gross sent at 6/6/2023  9:15 AM CDT -----  .Type:  Needs Medical Advice    Who Called: pt   Symptoms (please be specific):    How long has patient had these symptoms:    Pharmacy name and phone #:    Would the patient rather a call back or a response via [facility]? Call back   Best Call Back Number: [phone] or [phone]  Additional Information: Pt's wife wants to know do we have samples of ELIQUIS in the office? The insurance is questioning the loading dosage of the medication.   Dietician suggested digestive ensymes and  maniltol   Pt was receiving Pepside in the hospital but did not come home with a Prescription   Pt is requesting medical marijuana   Pt is requesting the prescription for a rollader walker for Radha's

## 2023-06-06 NOTE — TELEPHONE ENCOUNTER
Pt's wife notified, she would like MD to address the message about malitol and digestive enzymes. Also, asked if you will still be doing the PA for the Eliquis. Please advise

## 2023-06-06 NOTE — TELEPHONE ENCOUNTER
[facility] called to see if we will be doing a PA on Eliquis 5 mg BID. States plan has limitations.  Please advise    CB# [phone]

## 2023-06-07 NOTE — PROGRESS NOTES
Subjective:       Patient ID: Ace Gomes is a 72 y.o. male.    Chief Complaint:  Poor appetite    Diagnosis:   Metastatic pancreatic cancer                      Bilateral lower extremity DVTs and pulmonary emboli                      Remote history of rectal cancer    Current Treatment:  Treatment planning    Clinical History:  72 y.o. male with history of rectal cancer diagnosed about 20 years ago.  He had surgery but no chemotherapy or radiation therapy.  He did well for several years when he developed metastatic disease to the liver.  He underwent chemotherapy followed by resection of the liver lesion.  He has been disease-free since 2007.  He was followed at [facility] and [facility] in [place].     He was evaluated for weight loss and decreased appetite and on  5/18/23. CT A/P showed a focal hypoenhancing solid lesion at the posterior junction of the body and tail of the pancreas, measuring 2.2 x 4.5 cm, most likely a primary pancreatic malignancy.  Chronic changes of partial resection of the left hepatic lobe with residual liver normal in size and contour. However, there were numerous solid hypodense lesions scattered through the residual liver.  Largest lesion at the posterior right hepatic lobe measuring 2.8 x 2.5 cm.  Several abnormal appearing mildly enlarged upper abdominal lymph nodes, largest measuring 1.5 x 1.9 cm. It also showed 2 mildly irregular abnormal appearing lymph node in the right epicardial fat pad with the larger measuring 1.7 x 1.2 cm.  He contacted [facility] 5/22/23 to schedule an appointment there.     EUS 5/31/23 showed erosive gastropathy with no bleeding.  Pancreatic body mass and liver mets were seen by ultrasound.  FNA of the pancreatic mass and a liver lesion were both positive for poorly differentiated adenocarcinoma.  Immunophenotype consistent with primary pancreatic cancer and not metastatic colorectal cancer.  CA 19-9 level was elevated at 1358 U/ml.     CTA chest  6/2/23 done secondary to increased shortness of breath showed right-sided pulmonary emboli, mild-to-moderate clot burden with an enlarged epicardial node.  Body/tail mass and hepatic metastases were again noted.  Was referred to the hospital for admission and started on full-dose Lovenox.  He was subsequently transitioned to Eliquis.  Venous ultrasound of the lower extremity 6/5/23 showed a DVT in the right posterior tibial vein and left posterior tibial and peroneal veins.      Interval History  He returns to the office today for a follow-up visit accompanied by his wife and son.  He is ambulatory with a walker.  He has dyspnea with exertion, improved since discharge from the hospital.  He has intermittent nausea and anorexia.  Appetite and p.o. intake have been decreased.  He has mild, intermittent lower extremity swelling.  No significant lower extremity pain.  No chest pain, cough, sputum production or hemoptysis.  He is scheduled for placement of a MediPort catheter 6/19/23.    Review of Systems   Constitutional:  Positive for activity change, appetite change and fatigue. Negative for fever.   HENT:  Negative for mouth sores, sore throat and trouble swallowing.    Eyes: Negative.    Respiratory:  Positive for shortness of breath (With activity). Negative for cough and chest tightness.    Cardiovascular:  Positive for leg swelling. Negative for chest pain and palpitations.   Gastrointestinal:  Positive for nausea. Negative for abdominal distention, abdominal pain, constipation, diarrhea and vomiting.   Genitourinary:  Negative for dysuria, frequency and urgency.   Musculoskeletal:  Negative for arthralgias and back pain.   Integumentary:  Negative for pallor and rash.   Neurological:  Negative for dizziness, weakness, numbness and headaches.   Hematological:  Negative for adenopathy. Does not bruise/bleed easily.   Psychiatric/Behavioral: Negative.         PMHx:  HTN, hyperlipidemia, DM, GERD, rectal  "cancer  PSHx:  Colon resection, partial liver resection  SH:  Former smoker, no significant alcohol use.  Lives in [place] with his wife.  FH:  His mother and father both had lung cancer.    Objective:        BP (!) 147/71 (BP Location: Right arm)   Pulse (!) 123   Temp 98.4 °F (36.9 °C) (Oral)   Resp 20   Ht 5' 11" (1.803 m)   Wt 100 kg (220 lb 8 oz)   BMI 30.75 kg/m²    Physical Exam  Constitutional:       Comments: Elderly, well-developed white male in NAD   HENT:      Head: Normocephalic and atraumatic.      Mouth/Throat:      Mouth: Mucous membranes are moist.      Pharynx: Oropharynx is clear. No posterior oropharyngeal erythema.   Eyes:      Extraocular Movements: Extraocular movements intact.      Conjunctiva/sclera: Conjunctivae normal.      Pupils: Pupils are equal, round, and reactive to light.   Cardiovascular:      Rate and Rhythm: Normal rate and regular rhythm.      Heart sounds: No murmur heard.  Pulmonary:      Comments: Lungs clear to auscultation.  Abdominal:      General: There is no distension.      Palpations: Abdomen is soft. There is no mass.      Tenderness: There is no abdominal tenderness.      Comments: Bowel sounds decreased.  Well-healed abdominal incision.  No palpable masses or organomegaly   Musculoskeletal:         General: No swelling or tenderness. Normal range of motion.      Cervical back: Neck supple. No tenderness.   Lymphadenopathy:      Cervical: No cervical adenopathy.   Skin:     General: Skin is warm and dry.      Findings: No rash.   Neurological:      General: No focal deficit present.      Mental Status: He is alert and oriented to person, place, and time.      Cranial Nerves: No cranial nerve deficit.      Motor: No weakness.     ECOG SCORE    1 - Restricted in strenuous activity-ambulatory and able to carry out work of a light nature          LABORATORY  Recent Results (from the past 168 hour(s))   POCT glucose    Collection Time: 06/05/23  4:23 PM   Result " Value Ref Range    POCT Glucose 253 (H) 70 - 110 mg/dL   Comprehensive metabolic panel    Collection Time: 06/10/23  3:10 PM   Result Value Ref Range    Sodium Level 133 (L) 136 - 145 mmol/L    Potassium Level 3.4 (L) 3.5 - 5.1 mmol/L    Chloride 93 (L) 98 - 107 mmol/L    Carbon Dioxide 24 23 - 31 mmol/L    Glucose Level 212 (H) 82 - 115 mg/dL    Blood Urea Nitrogen 9.9 8.4 - 25.7 mg/dL    Creatinine 0.84 0.73 - 1.18 mg/dL    Calcium Level Total 9.7 8.8 - 10.0 mg/dL    Protein Total 8.1 (H) 5.8 - 7.6 gm/dL    Albumin Level 2.7 (L) 3.4 - 4.8 g/dL    Globulin 5.4 (H) 2.4 - 3.5 gm/dL    Albumin/Globulin Ratio 0.5 (L) 1.1 - 2.0 ratio    Bilirubin Total 0.8 <=1.5 mg/dL    Alkaline Phosphatase 97 40 - 150 unit/L    Alanine Aminotransferase 21 0 - 55 unit/L    Aspartate Aminotransferase 21 5 - 34 unit/L    eGFR >60 mls/min/1.73/m2   CBC with Differential    Collection Time: 06/10/23  3:10 PM   Result Value Ref Range    WBC 15.15 (H) 4.50 - 11.50 x10(3)/mcL    RBC 3.17 (L) 4.70 - 6.10 x10(6)/mcL    Hgb 9.2 (L) 14.0 - 18.0 g/dL    Hct 28.0 (L) 42.0 - 52.0 %    MCV 88.3 80.0 - 94.0 fL    MCH 29.0 27.0 - 31.0 pg    MCHC 32.9 (L) 33.0 - 36.0 g/dL    RDW 14.4 11.5 - 17.0 %    Platelet 381 130 - 400 x10(3)/mcL    MPV 8.7 7.4 - 10.4 fL    Neut % 82.0 %    Lymph % 8.4 %    Mono % 8.3 %    Eos % 0.1 %    Basophil % 0.3 %    Lymph # 1.28 0.6 - 4.6 x10(3)/mcL    Neut # 12.42 (H) 2.1 - 9.2 x10(3)/mcL    Mono # 1.25 0.1 - 1.3 x10(3)/mcL    Eos # 0.02 0 - 0.9 x10(3)/mcL    Baso # 0.04 <=0.2 x10(3)/mcL    IG# 0.14 (H) 0 - 0.04 x10(3)/mcL    IG% 0.9 %    NRBC% 0.0 %   Blood Culture #2 **CANNOT BE ORDERED STAT**    Collection Time: 06/10/23  4:11 PM    Specimen: Blood   Result Value Ref Range    CULTURE, BLOOD ([facility]) No Growth At 24 Hours    Blood Culture #1 **CANNOT BE ORDERED STAT**    Collection Time: 06/10/23  4:11 PM    Specimen: Blood   Result Value Ref Range    CULTURE, BLOOD ([facility]) No Growth At 24 Hours             Assessment:    Metastatic pancreatic cancer  Bilateral lower extremity DVTs and PE  Anemia  Remote history of rectal cancer      Plan:   Patient's CT images were reviewed in the office today with him and his family.  Palliative chemotherapy was recommended with a regimen of Gemcitabine 1000 mg/m2 and Abraxane 125 mg/m2 on day 1 and 15 every 28 days.  Benefits, risks, toxicities and side effects of this treatment regimen were discussed and reviewed in detail. All questions were answered. Literature regarding the treatment plan and specific drug information was also provided.  He is scheduled for placement of a MediPort catheter 6/19/23.  He will be scheduled to start treatment 6/20/23 following MediPort placement and patient education.  The patient and his family understand that his disease is considered incurable and that treatment goals are palliative including relief of symptoms, prevention of disease-related complications and prolongation of life.  Continue Eliquis 5 mg b.i.d. for management of his bilateral lower extremity DVTs and PE.  RTC in 3 weeks for a follow-up visit and toxicity assessment.      BARBARA ANTHONY MD    Other Physicians  Dr. Jimmy Jorgensen

## 2023-06-08 NOTE — TELEPHONE ENCOUNTER
----- Message from Lacy Dunn sent at 6/8/2023 11:19 AM CDT -----  .Type:  Needs Medical Advice    Who Called: pt wife    Would the patient rather a call back or a response via [facility]?   Best Call Back Number: [phone]  Additional Information: apixaban (ELIQUIS) 5 mg Tab the problem is solved said wife and since pt just got out hospital do he need labs for his appt asked by wife

## 2023-06-10 NOTE — FIRST PROVIDER EVALUATION
Medical screening examination initiated.  I have conducted a focused provider triage encounter, findings are as follows:    Brief history of present illness:  71y/o M presents to the ED with right leg swelling. Recently started on Eliquis . Recently dx with pancreatic CA    There were no vitals filed for this visit.    Pertinent physical exam:  AAA x 3    Brief workup plan:  Labs/Imaging    Preliminary workup initiated; this workup will be continued and followed by the physician or advanced practice provider that is assigned to the patient when roomed.

## 2023-06-10 NOTE — ED PROVIDER NOTES
Encounter Date: 6/10/2023    SCRIBE #1 NOTE: I, Abigail Garcia, am scribing for, and in the presence of,  Cheyenne Dorado MD. I have scribed the following portions of the note - Other sections scribed: HPI, ROS, PE, MDM.     History     Chief Complaint   Patient presents with    Leg Swelling     Pt presents c/o right lower extremity swelling. Onset this am.  PMH recent admit dvt / PE.  On loading dose of eliquis.       72-year-old male with a history of hypertension, diabetes, recently diagnosed with pancreatic cancer as well as bilateral lower extremity DVT and PE noted on admit 5 days ago presents to the emergency department for evaluation of right leg swelling.  Family notes that there was previously by pedal swelling, now noted some swelling into the right lower leg/calf area.  He denies any associated pain.  He denies any chest pain or shortness of breath.  He reports compliance with home Eliquis 5 mg b.i.d. as prescribed on last hospital stay      The history is provided by the patient, the spouse and medical records. No  was used.   Illness   The current episode started today. The problem has been unchanged. Pertinent negatives include no fever, no abdominal pain, no nausea, no vomiting, no headaches, no cough, no shortness of breath and no rash. Services received include medications given and tests performed. Recently, medical care has been given by the PCP.   Review of patient's allergies indicates:  No Known Allergies  Past Medical History:   Diagnosis Date    Adenocarcinoma of rectum     Diabetes mellitus, type 2     Gastrointestinal tract imaging abnormality     Hyperlipidemia     Hypertension     Lesion of liver     Other intra-abdominal and pelvic swelling, mass and lump     Personal history of colonic polyps      Past Surgical History:   Procedure Laterality Date    COLON RESECTION      COLONOSCOPY  02/08/2016    EGD, WITH CLOSED BIOPSY  05/31/2023    Procedure: EGD, WITH CLOSED  BIOPSY;  Surgeon: Manpreet Jorgensen MD;  Location: [facility] OR;  Service: Gastroenterology;;    ENDOSCOPY  05/31/2023    with U/S    LIVER RESECTION  2007    LIVER SURGERY      thumb surgery Left     ULTRASOUND, ENDOSCOPIC, WITH FINE NEEDLE ASPIRATION N/A 05/31/2023    Procedure: UPPER EUS W/ POSS FNA;  Surgeon: Manpreet Jorgensen MD;  Location: [facility] OR;  Service: Gastroenterology;  Laterality: N/A;  Hx ofrectal CA s/p resection. Partial resection of the left hepatic lobe. Numerous lesion in theresidual liver up to 2.8cm. 4.5cm lesion in distal BOP/TOP. 2cm portal LN. LABS W/ PROCEDURE     Family History   Problem Relation Age of Onset    Lung cancer Mother     Lung cancer Father     No Known Problems Sister     No Known Problems Brother      Social History     Tobacco Use    Smoking status: Former    Smokeless tobacco: Never   Substance Use Topics    Alcohol use: Never     Comment: Ocassionally    Drug use: Never     Review of Systems   Constitutional:  Negative for chills, diaphoresis and fever.   Eyes:  Negative for visual disturbance.   Respiratory:  Negative for cough and shortness of breath.    Cardiovascular:  Positive for leg swelling. Negative for chest pain and palpitations.   Gastrointestinal:  Negative for abdominal pain, nausea and vomiting.   Genitourinary:  Negative for dysuria and hematuria.   Skin:  Negative for rash.   Neurological:  Negative for weakness, numbness and headaches.   All other systems reviewed and are negative.    Physical Exam     Initial Vitals   BP Pulse Resp Temp SpO2   06/10/23 1503 06/10/23 1245 06/10/23 1245 06/10/23 1245 06/10/23 1245   (!) 140/79 (!) 116 20 98.9 °F (37.2 °C) 95 %      MAP       --                Physical Exam    Nursing note and vitals reviewed.  Constitutional: He appears well-developed and well-nourished. No distress.   HENT:   Head: Normocephalic and atraumatic.   Mouth/Throat: Oropharynx is clear and moist.   Eyes: Conjunctivae are normal.   Neck: Neck supple.    Normal range of motion.  Cardiovascular:  Regular rhythm and intact distal pulses.           No murmur heard.  Pulses:       Dorsalis pedis pulses are 2+ on the right side and 2+ on the left side.   Mildly tachycardic.    Pulmonary/Chest: Breath sounds normal. No respiratory distress.   Abdominal: Abdomen is soft. He exhibits no distension. There is no abdominal tenderness.   Musculoskeletal:         General: Normal range of motion.      Cervical back: Normal range of motion and neck supple.      Lumbar back: Normal. No tenderness. Normal range of motion.      Comments: R leg slightly larger than L. R leg is warm and well-perfused; no discoloration.      Neurological: He is alert and oriented to person, place, and time. He has normal strength. No cranial nerve deficit or sensory deficit.   Skin: Skin is warm and dry.       ED Course   Procedures  Labs Reviewed   COMPREHENSIVE METABOLIC PANEL - Abnormal; Notable for the following components:       Result Value    Sodium Level 133 (*)     Potassium Level 3.4 (*)     Chloride 93 (*)     Glucose Level 212 (*)     Protein Total 8.1 (*)     Albumin Level 2.7 (*)     Globulin 5.4 (*)     Albumin/Globulin Ratio 0.5 (*)     All other components within normal limits   CBC WITH DIFFERENTIAL - Abnormal; Notable for the following components:    WBC 15.15 (*)     RBC 3.17 (*)     Hgb 9.2 (*)     Hct 28.0 (*)     MCHC 32.9 (*)     Neut # 12.42 (*)     IG# 0.14 (*)     All other components within normal limits   BLOOD CULTURE [facility]   BLOOD CULTURE [facility]   CBC W/ AUTO DIFFERENTIAL    Narrative:     The following orders were created for panel order CBC Auto Differential.  Procedure                               Abnormality         Status                     ---------                               -----------         ------                     CBC with Differential[[id number]]        Abnormal            Final result                 Please view results for these tests on the individual  orders.          Imaging Results    CV ultrasound right lower extremity:Positive deep vein thrombosis in the right posterior tibial and peroneal veins.  All other vessels of the right lower extremity compressed and augmented well.          Medications - No data to display           Scribe Attestation:   Scribe #1: I performed the above scribed service and the documentation accurately describes the services I performed. I attest to the accuracy of the note.    Attending Attestation:           Physician Attestation for Scribe:  Physician Attestation Statement for Scribe #1: I, Cheyenne Dorado MD, reviewed documentation, as scribed by Abigail Garcia in my presence, and it is both accurate and complete.           ED Course as of 06/10/23 1630   Sat Donnie 10, 2023   1532 Vascular ultrasound today with right posterior tibial and peroneal DVT noted.  Similar findings compared to previous though only noted posterior tibial at that time.  He did have a left posterior tibial and peroneal DVT described on the previous imaging study.  Reports compliant with home Eliquis.  Paged on call for the patient's primary care physician to discuss results of ED work up and plan for disposition.  [KS]   1556 Discussed with Dr. Kavin Espino, advised reassurance, continue home medications, follow-up with his hematologist on Monday as previously scheduled.  We discussed given the reported high fevers at home, will collect a set of blood cultures x2; however, no focal infectious symptoms at this time to require initiation of antibiotics.  Fevers may be related to tumor and/or clot burden. Patient will be contacted if cultures with + growth. ED return precautions reviewed at the bedside and provided in the written discharge instructions. All questions answered to the best of my ability.      [KS]      ED Course User Index  [KS] Cheyenne Dorado MD            Medical Decision Making  Problems Addressed:  Acute deep vein thrombosis (DVT) of  both peroneal veins: acute illness or injury  Fever, unspecified fever cause: undiagnosed new problem with uncertain prognosis  Lower extremity edema: acute illness or injury  Malignant neoplasm of pancreas, unspecified location of malignancy: chronic illness or injury with exacerbation, progression, or side effects of treatment  Pulmonary embolism, unspecified chronicity, unspecified pulmonary embolism type, unspecified whether acute cor pulmonale present: acute illness or injury      ED assessment:    Mr. Gomes presented for re-evaluation of leg swelling in the setting of recent diagnosis of bilateral lower extremity DVT and pulmonary embolus with history of pancreatic cancer awaiting initiation treatment.  Mild swelling noted on examination, nonpitting, no perfusing vascular compromise appreciated    Differential diagnosis (including but not limited to):   Progress DVT, dependent edema, third-spacing, vascular permeability, electrolyte derangements, nephrotic syndrome    ED management:   See ED course above      Amount and/or Complexity of Data Reviewed  Independent historian: spouse    Summary of history:  Spouse reports that she and her son noted swelling to the patient's lower extremities, the patient did not appreciate this himself  External data reviewed: notes from previous admissions, notes from previous ED visits, notes from clinic visits, prior imaging, and prescription medications   Summary of data reviewed:  Patient admitted here 06/02/2023 with worsening shortness of breath, found to have right-sided pulmonary emboli, mild to moderate clot burden, also with bilateral lower extremity DVTs, right posterior tibial and left posterior tibial and peroneal.  He was started on Eliquis 5 mg b.i.d..  Risk and benefits of testing: discussed   Labs: ordered and reviewed  Radiology: ordered and reviewed    Discussion of management or test interpretation with external provider(s): discussed with primary care  physician   Summary of discussion:  As above    Risk  Shared decision making     Critical Care  none    I, Cheyenne Dorado MD personally performed the history, PE, MDM, and procedures as documented above and agree with the scribe's documentation.          Clinical Impression:   Final diagnoses:  [M79.89] Right leg swelling  [I82.453] Acute deep vein thrombosis (DVT) of both peroneal veins (Primary)  [I26.99] Pulmonary embolism, unspecified chronicity, unspecified pulmonary embolism type, unspecified whether acute cor pulmonale present  [R50.9] Fever, unspecified fever cause  [C25.9] Malignant neoplasm of pancreas, unspecified location of malignancy  [R60.0] Lower extremity edema        ED Disposition Condition    Discharge Stable          ED Prescriptions    None       Follow-up Information       Follow up With Specialties Details Why Contact Info    Jimmy Zamorano II, MD Internal Medicine Schedule an appointment as soon as possible for a visit   [address] [address]  [phone]      Marcel Newton MD Oncology, Hematology and Oncology On 6/12/2023 keep scheduled appointment [address]  [place] [address]  [phone]      [facility] - Emergency Dept Emergency Medicine  As needed, If symptoms worsen [facility] [facility] [id number] [phone]             Cheyenne Dorado MD  06/11/23 1746

## 2023-06-12 PROBLEM — C25.8 OVERLAPPING MALIGNANT NEOPLASM OF PANCREAS: Status: ACTIVE | Noted: 2023-01-01

## 2023-06-12 PROBLEM — C78.7 SECONDARY MALIGNANT NEOPLASM OF LIVER: Status: ACTIVE | Noted: 2023-01-01

## 2023-06-12 NOTE — TELEPHONE ENCOUNTER
----- Message from Charu Welch sent at 6/10/2023 10:41 AM CDT -----  Regarding: on call msg  Msg sent to on call provider 6/10 @ 10:42      Patient Name:  Ace Gomes    mrn# [id number]  :  1950  Phone Number (with area code):  [phone] wife Trisha  Reason for Call: pt was discharged from [facility] (for blood clots) on  and prescribed eliquis  and now his right calf is swollen.  Pt was advised to contact doctor if this symptom appears. Pt just diagnosed pancreatic cancer which has metastasized to liver   PCP: Dr Zamorano

## 2023-06-13 NOTE — PROGRESS NOTES
"  Patient ID: [id number]     Chief Complaint: Pre-op Exam (Pt is here for sx clearance for a port to be put in chest also needs you to fill out the handicap parking form from yesterday. )        HPI:     Ace Gomes is a 72 y.o. male here today for a surgery clearance. Plans to have Mediport placement on 6/19/23. Unfortunate situation, which I have been seeing patient for the last few weeks with additional ER an hospital visits r/t recurrent metastatic prostate CA, bilateral DVTs/Pes, anemia, and remote h/o rectal CA. He saw Dr. Newton yesterday for eval/tx with plan for palliative chemotherapy was recommended with a regimen of Gemcitabine 1000 mg/m2 and Abraxane 125 mg/m2 on day 1 and 15 every 28 days. He reports some neck pain. He is applied THC topical with some relief. Taking "gummie" for sleep as well. Patient did discuss with Dr. Newton/Javon, who recommended to proceed with Eliquis as ordered.    ----------------------------  Adenocarcinoma of rectum  Diabetes mellitus, type 2  Gastrointestinal tract imaging abnormality  Hyperlipidemia  Hypertension  Lesion of liver  Other intra-abdominal and pelvic swelling, mass and lump  Personal history of colonic polyps     Past Surgical History:   Procedure Laterality Date    COLON RESECTION      COLONOSCOPY  02/08/2016    EGD, WITH CLOSED BIOPSY  05/31/2023    Procedure: EGD, WITH CLOSED BIOPSY;  Surgeon: Manpreet Jorgensen MD;  Location: [facility] OR;  Service: Gastroenterology;;    ENDOSCOPY  05/31/2023    with U/S    LIVER RESECTION  2007    LIVER SURGERY      thumb surgery Left     ULTRASOUND, ENDOSCOPIC, WITH FINE NEEDLE ASPIRATION N/A 05/31/2023    Procedure: UPPER EUS W/ POSS FNA;  Surgeon: Manpreet Jorgensen MD;  Location: [facility] OR;  Service: Gastroenterology;  Laterality: N/A;  Hx ofrectal CA s/p resection. Partial resection of the left hepatic lobe. Numerous lesion in theresidual liver up to 2.8cm. 4.5cm lesion in distal BOP/TOP. 2cm portal LN. LABS W/ PROCEDURE "       Review of patient's allergies indicates:  No Known Allergies    Outpatient Medications Marked as Taking for the 6/13/23 encounter (Office Visit) with Lanie Longoria NP   Medication Sig Dispense Refill    ALPRAZolam (XANAX) 0.5 MG tablet Take 1 tablet (0.5 mg total) by mouth nightly as needed for Anxiety. 30 tablet 0    apixaban (ELIQUIS) 5 mg Tab Take 1 tablet (5 mg total) by mouth 2 (two) times daily. 30 tablet 3    clindamycin (CLEOCIN T) 1 % external solution Apply topically 2 (two) times daily.      dulaglutide (TRULICITY) 1.5 mg/0.5 mL pen injector INJECT 0.5ML SUBCUTANEOUS WEEKLY 4 pen 3    felodipine (PLENDIL) 5 MG 24 hr tablet TAKE ONE TABLET BY MOUTH EVERY DAY 90 tablet 3    glyBURIDE (DIABETA) 5 MG tablet Take 2 tablets (10 mg total) by mouth daily with breakfast. 180 tablet 3    hydroCHLOROthiazide (HYDRODIURIL) 12.5 MG Tab TAKE ONE TABLET BY MOUTH EVERY DAY 90 tablet 1    meloxicam (MOBIC) 15 MG tablet Take 15 mg by mouth.      metFORMIN (GLUCOPHAGE-XR) 500 MG ER 24hr tablet Take 2 tablets (1,000 mg total) by mouth once daily. 60 tablet 3    moxifloxacin (VIGAMOX) 0.5 % ophthalmic solution Place into both eyes.      ondansetron (ZOFRAN-ODT) 4 MG TbDL Take 1 tablet (4 mg total) by mouth every 8 (eight) hours as needed (nausea). 1 tablet 0    pioglitazone (ACTOS) 30 MG tablet Take 1 tablet (30 mg total) by mouth once daily. 90 tablet 3    prednisoLONE acetate (PRED FORTE) 1 % DrpS Place into both eyes.      PROLENSA 0.07 % Drop SMARTSIG:In Eye(s)      simvastatin (ZOCOR) 40 MG tablet TAKE ONE TABLET BY MOUTH AT BEDTIME 90 tablet 2    UNABLE TO FIND THC 10 mg chews tropical flavor      UNABLE TO FIND THC chews 125 mg      UNABLE TO FIND THC 12 mg night time chews      ZENPEP 40,000-126,000- 168,000 unit CpDR Take by mouth.         Social History     Socioeconomic History    Marital status:    Tobacco Use    Smoking status: Former    Smokeless tobacco: Never   Substance and Sexual Activity     Alcohol use: Never     Comment: Ocassionally    Drug use: Never    Sexual activity: Yes     Social Determinants of Health     Financial Resource Strain: Low Risk     Difficulty of Paying Living Expenses: Not hard at all   Food Insecurity: No Food Insecurity    Worried About Running Out of Food in the Last Year: Never true    Ran Out of Food in the Last Year: Never true   Transportation Needs: No Transportation Needs    Lack of Transportation (Medical): No    Lack of Transportation (Non-Medical): No   Stress: No Stress Concern Present    Feeling of Stress : Not at all   Housing Stability: Low Risk     Unable to Pay for Housing in the Last Year: No    Number of Places Lived in the Last Year: 1    Unstable Housing in the Last Year: No        Family History   Problem Relation Age of Onset    Lung cancer Mother     Lung cancer Father     No Known Problems Sister     No Known Problems Brother         Patient Care Team:  Jimmy Zamorano II, MD as PCP - General (Internal Medicine)  MD Tila Alston II, MD as Consulting Physician (Ophthalmology)     Subjective:     ROS    See HPI for details    Constitutional: Denies Change in appetite. Denies Chills. Denies Fever. Denies Night sweats.  Eye: Denies Blurred vision. Denies Discharge. Denies Eye pain.  ENT: Denies Decreased hearing. Denies Sore throat. Denies Swollen glands.  Respiratory: Denies Cough. Denies Shortness of breath. Denies Shortness of breath with exertion. Denies Wheezing.  Cardiovascular: DeniesChest pain at rest. Denies Chest pain with exertion. Denies Irregular heartbeat. Denies Palpitations.  Gastrointestinal: Denies Abdominal pain. DeniesDiarrhea. Denies Nausea. Denies Vomiting. Denies Hematemesis or Hematochezia.  Genitourinary: Denies Dysuria. Denies Urinary frequency. Denies Urinary urgency. Denies Blood in urine.  Endocrine: Denies Cold intolerance. Denies Excessive thirst. Denies Heat intolerance. Denies Weight loss. Denies  "Weight gain.  Musculoskeletal: Denies Painful joints. Denies Weakness.  Integumentary: Denies Rash. Denies Itching. Denies Dry skin.  Neurologic: Denies Dizziness. Denies Fainting. Denies Headache.  Psychiatric: Denies Depression. Denies Anxiety. Denies Suicidal/Homicidal ideations.    All Other ROS: Negative except as stated in HPI.       Objective:     /68 (BP Location: Left arm, Patient Position: Sitting, BP Method: Medium (Manual))   Pulse 110   Resp 14   Ht 5' 11" (1.803 m)   Wt 98.9 kg (218 lb)   SpO2 96%   BMI 30.40 kg/m²     Physical Exam    General: Alert and oriented, No acute distress.  Head: Normocephalic, Atraumatic.  Eye: Pupils are equal, round and reactive to light, Extraocular movements are intact, Sclera non-icteric.  Ears/Nose/Throat: Normal, Mucosa moist,Clear.  Neck/Thyroid: Supple, Non-tender, No carotid bruit, No palpable thyromegaly or thyroid nodule, No lymphadenopathy, No JVD, Full range of motion.  Respiratory: Clear to auscultation bilaterally; No wheezes, rales or rhonchi,Non-labored respirations, Symmetrical chest wall expansion.  Cardiovascular: Regular rate and rhythm, S1/S2 normal, No murmurs, rubs or gallops.  Gastrointestinal: Soft, Non-tender, Non-distended, Normal bowel sounds, No palpable organomegaly.  Musculoskeletal: Normal range of motion.  Integumentary: Warm, Dry, Intact, No suspicious lesions or rashes.  Extremities: No clubbing, cyanosis or edema  Neurologic: No focal deficits, Cranial Nerves II-XII are grossly intact, Motor strength normal upper and lower extremities, Sensory exam intact.  Psychiatric: Normal interaction, Coherent speech, Euthymic mood, Appropriate affect       The 10-year ASCVD risk score (Albino DK, et al., 2019) is: 32.9%    Values used to calculate the score:      Age: 72 years      Sex: Male      Is Non- : No      Diabetic: Yes      Tobacco smoker: No      Systolic Blood Pressure: 118 mmHg      Is BP treated: " Yes      HDL Cholesterol: 62 mg/dL      Total Cholesterol: 186 mg/dL      Assessment:       ICD-10-CM ICD-9-CM   1. Pre-op examination  Z01.818 V72.84   2. Acute deep vein thrombosis of peroneal vein, bilateral  I82.453 453.42   3. Pulmonary embolism, unspecified chronicity, unspecified pulmonary embolism type, unspecified whether acute cor pulmonale present  I26.99 415.19   4. Malignant neoplasm of pancreas, unspecified location of malignancy  C25.9 157.9   5. Neck pain  M54.2 723.1   6. Gastroesophageal reflux disease without esophagitis  K21.9 530.81   7. Chronic cough  R05.3 786.2   8. Fever, unspecified fever cause  R50.9 780.60        Plan:     1. Pre-op examination  Medically cleared for Mediport placement-Eliquis to be managed per Dr. Alejandro and Dr. Newton.  Patient aware of significant risks associated with procedure-advised further questions to be deferred to sx    2. Acute deep vein thrombosis of peroneal vein, bilateral  The patient is stable on the current regimen.    Continue current medications for this problem.      3. Pulmonary embolism, unspecified chronicity, unspecified pulmonary embolism type, unspecified whether acute cor pulmonale present  The patient is stable on the current regimen.    Continue current medications for this problem.     4. Malignant neoplasm of pancreas, unspecified location of malignancy  The patient is stable on the current regimen.    Continue current medications for this problem.     5. Neck pain  Tylenol as needed  Avoid all forms of NSAIDs    6. Gastroesophageal reflux disease without esophagitis  May continue Pepcid as needed    7. Chronic cough  See above    8. Fever, unspecified fever cause  Likely tumor related.  Continue current POC    The patient is cleared for the planned procedure as scheduled as all of their chronic conditions are optimally controlled.         Medication List with Changes/Refills   Current Medications    ALPRAZOLAM (XANAX) 0.5 MG TABLET    Take  1 tablet (0.5 mg total) by mouth nightly as needed for Anxiety.       Start Date: 5/22/2023 End Date: 6/21/2023    APIXABAN (ELIQUIS) 5 MG TAB    Take 1 tablet (5 mg total) by mouth 2 (two) times daily.       Start Date: 6/5/2023  End Date: --    CLINDAMYCIN (CLEOCIN T) 1 % EXTERNAL SOLUTION    Apply topically 2 (two) times daily.       Start Date: 6/1/2023  End Date: --    DULAGLUTIDE (TRULICITY) 1.5 MG/0.5 ML PEN INJECTOR    INJECT 0.5ML SUBCUTANEOUS WEEKLY       Start Date: 4/13/2023 End Date: --    FAMOTIDINE (PEPCID) 40 MG TABLET    Take 1 tablet (40 mg total) by mouth once daily.       Start Date: 6/6/2023  End Date: 6/5/2024    FELODIPINE (PLENDIL) 5 MG 24 HR TABLET    TAKE ONE TABLET BY MOUTH EVERY DAY       Start Date: 5/16/2023 End Date: --    GLYBURIDE (DIABETA) 5 MG TABLET    Take 2 tablets (10 mg total) by mouth daily with breakfast.       Start Date: 6/2/2023  End Date: --    HYDROCHLOROTHIAZIDE (HYDRODIURIL) 12.5 MG TAB    TAKE ONE TABLET BY MOUTH EVERY DAY       Start Date: 6/12/2023 End Date: --    MELOXICAM (MOBIC) 15 MG TABLET    Take 15 mg by mouth.       Start Date: 3/16/2023 End Date: --    METFORMIN (GLUCOPHAGE-XR) 500 MG ER 24HR TABLET    Take 2 tablets (1,000 mg total) by mouth once daily.       Start Date: 5/22/2023 End Date: --    MOXIFLOXACIN (VIGAMOX) 0.5 % OPHTHALMIC SOLUTION    Place into both eyes.       Start Date: 4/17/2023 End Date: --    ONDANSETRON (ZOFRAN-ODT) 4 MG TBDL    Take 1 tablet (4 mg total) by mouth every 8 (eight) hours as needed (nausea).       Start Date: 6/2/2023  End Date: --    PIOGLITAZONE (ACTOS) 30 MG TABLET    Take 1 tablet (30 mg total) by mouth once daily.       Start Date: 7/25/2022 End Date: 7/25/2023    PREDNISOLONE ACETATE (PRED FORTE) 1 % DRPS    Place into both eyes.       Start Date: 4/14/2023 End Date: --    PROLENSA 0.07 % DROP    SMARTSIG:In Eye(s)       Start Date: 4/17/2023 End Date: --    SIMVASTATIN (ZOCOR) 40 MG TABLET    TAKE ONE TABLET BY  MOUTH AT BEDTIME       Start Date: 2/15/2023 End Date: --    UNABLE TO FIND    THC 10 mg chews tropical flavor       Start Date: 6/9/2023  End Date: --    UNABLE TO FIND    THC chews 125 mg       Start Date: 6/9/2023  End Date: --    UNABLE TO FIND    THC 12 mg night time chews       Start Date: 6/9/2023  End Date: --    ZENPEP 40,000-126,000- 168,000 UNIT CPDR    Take by mouth.       Start Date: 6/7/2023  End Date: --          No follow-ups on file.  In addition to their scheduled follow up, the patient has also been instructed to follow up on as needed basis.

## 2023-06-13 NOTE — PROGRESS NOTES
Subjective:       Patient ID: Ace Gomes is a 72 y.o. male.      Chief Complaint: Here for chemotherapy education    HPI  Diagnosis:   Metastatic pancreatic cancer                      Bilateral lower extremity DVTs and pulmonary emboli                      Remote history of rectal cancer     Clinical History:  72 y.o. male with history of rectal cancer diagnosed about 20 years ago.  He had surgery but no chemotherapy or radiation therapy.  He did well for several years when he developed metastatic disease to the liver.  He underwent chemotherapy followed by resection of the liver lesion.  He has been disease-free since 2007.  He was followed at [facility] ([place] and [facility] in [place].     He was evaluated for weight loss and decreased appetite and on  5/18/23. CT A/P showed a focal hypoenhancing solid lesion at the posterior junction of the body and tail of the pancreas, measuring 2.2 x 4.5 cm, most likely a primary pancreatic malignancy.  Chronic changes of partial resection of the left hepatic lobe with residual liver normal in size and contour. However, there were numerous solid hypodense lesions scattered through the residual liver.  Largest lesion at the posterior right hepatic lobe measuring 2.8 x 2.5 cm.  Several abnormal appearing mildly enlarged upper abdominal lymph nodes, largest measuring 1.5 x 1.9 cm. It also showed 2 mildly irregular abnormal appearing lymph node in the right epicardial fat pad with the larger measuring 1.7 x 1.2 cm.  He contacted [facility] 5/22/23 to schedule an appointment there.     EUS 5/31/23 showed erosive gastropathy with no bleeding.  Pancreatic body mass and liver mets were seen by ultrasound.  FNA of the pancreatic mass and a liver lesion were both positive for poorly differentiated adenocarcinoma.  Immunophenotype consistent with primary pancreatic cancer and not metastatic colorectal cancer.  CA 19-9 level was elevated at 1358 U/ml.     CTA chest 6/2/23 done secondary  to increased shortness of breath showed right-sided pulmonary emboli, mild-to-moderate clot burden with an enlarged epicardial node.  Body/tail mass and hepatic metastases were again noted.  He was started on full-dose Lovenox.  He was subsequently transitioned to Eliquis.  Venous ultrasound of the lower extremity 6/5/23 showed a DVT in the right posterior tibial vein and left posterior tibial and peroneal veins.    Palliative chemotherapy was recommended with a regimen of Gemcitabine 1000 mg/m2 and Abraxane 125 mg/m2 on day 1 and 15 every 28 days.    Patient presents today for chemotherapy education.  He is accompanied by his wife and daughter.    Past Medical History:   Diagnosis Date    Adenocarcinoma of rectum     Diabetes mellitus, type 2     Gastrointestinal tract imaging abnormality     Hyperlipidemia     Hypertension     Lesion of liver     Other intra-abdominal and pelvic swelling, mass and lump     Personal history of colonic polyps       Review of patient's allergies indicates:  No Known Allergies     Current Outpatient Medications:     ALPRAZolam (XANAX) 0.5 MG tablet, Take 1 tablet (0.5 mg total) by mouth nightly as needed for Anxiety., Disp: 30 tablet, Rfl: 0    apixaban (ELIQUIS) 5 mg Tab, Take 1 tablet (5 mg total) by mouth 2 (two) times daily., Disp: 30 tablet, Rfl: 3    clindamycin (CLEOCIN T) 1 % external solution, Apply topically 2 (two) times daily., Disp: , Rfl:     dulaglutide (TRULICITY) 1.5 mg/0.5 mL pen injector, INJECT 0.5ML SUBCUTANEOUS WEEKLY, Disp: 4 pen, Rfl: 3    famotidine (PEPCID) 40 MG tablet, Take 1 tablet (40 mg total) by mouth once daily. (Patient not taking: Reported on 6/13/2023), Disp: 90 tablet, Rfl: 3    felodipine (PLENDIL) 5 MG 24 hr tablet, TAKE ONE TABLET BY MOUTH EVERY DAY, Disp: 90 tablet, Rfl: 3    glyBURIDE (DIABETA) 5 MG tablet, Take 2 tablets (10 mg total) by mouth daily with breakfast., Disp: 180 tablet, Rfl: 3    hydroCHLOROthiazide (HYDRODIURIL) 12.5 MG Tab,  TAKE ONE TABLET BY MOUTH EVERY DAY, Disp: 90 tablet, Rfl: 1    meloxicam (MOBIC) 15 MG tablet, Take 15 mg by mouth., Disp: , Rfl:     metFORMIN (GLUCOPHAGE-XR) 500 MG ER 24hr tablet, Take 2 tablets (1,000 mg total) by mouth once daily., Disp: 60 tablet, Rfl: 3    moxifloxacin (VIGAMOX) 0.5 % ophthalmic solution, Place into both eyes., Disp: , Rfl:     ondansetron (ZOFRAN) 8 MG tablet, Take 1 tablet (8 mg total) by mouth every 8 (eight) hours as needed for Nausea., Disp: 30 tablet, Rfl: 2    pioglitazone (ACTOS) 30 MG tablet, Take 1 tablet (30 mg total) by mouth once daily., Disp: 90 tablet, Rfl: 3    prednisoLONE acetate (PRED FORTE) 1 % DrpS, Place into both eyes., Disp: , Rfl:     PROLENSA 0.07 % Drop, SMARTSIG:In Eye(s), Disp: , Rfl:     simvastatin (ZOCOR) 40 MG tablet, TAKE ONE TABLET BY MOUTH AT BEDTIME, Disp: 90 tablet, Rfl: 2    UNABLE TO FIND, THC 10 mg chews tropical flavor, Disp: , Rfl:     UNABLE TO FIND, THC chews 125 mg, Disp: , Rfl:     UNABLE TO FIND, THC 12 mg night time chews, Disp: , Rfl:     ZENPEP 40,000-126,000- 168,000 unit CpDR, Take by mouth., Disp: , Rfl:   Review of Systems   Constitutional:  Positive for activity change, appetite change and fever.   HENT: Negative.     Respiratory:  Positive for shortness of breath.    Cardiovascular:  Negative for chest pain.   Gastrointestinal:  Positive for constipation. Negative for abdominal pain.   Endocrine: Negative.    Musculoskeletal:  Positive for back pain.   Integumentary:  Positive for rash.   Neurological:  Positive for weakness. Negative for seizures, light-headedness, numbness, headaches and memory loss.   Psychiatric/Behavioral:  Negative for dysphoric mood. The patient is not nervous/anxious.             ECOG SCORE    2 - Capable of all selfcare but unable to carry out any work activities, active > 50% of hours       Admission on 06/10/2023, Discharged on 06/10/2023   Component Date Value    Sodium Level 06/10/2023 133 (L)     Potassium  Level 06/10/2023 3.4 (L)     Chloride 06/10/2023 93 (L)     Carbon Dioxide 06/10/2023 24     Glucose Level 06/10/2023 212 (H)     Blood Urea Nitrogen 06/10/2023 9.9     Creatinine 06/10/2023 0.84     Calcium Level Total 06/10/2023 9.7     Protein Total 06/10/2023 8.1 (H)     Albumin Level 06/10/2023 2.7 (L)     Globulin 06/10/2023 5.4 (H)     Albumin/Globulin Ratio 06/10/2023 0.5 (L)     Bilirubin Total 06/10/2023 0.8     Alkaline Phosphatase 06/10/2023 97     Alanine Aminotransferase 06/10/2023 21     Aspartate Aminotransfera* 06/10/2023 21     eGFR 06/10/2023 >60     WBC 06/10/2023 15.15 (H)     RBC 06/10/2023 3.17 (L)     Hgb 06/10/2023 9.2 (L)     Hct 06/10/2023 28.0 (L)     MCV 06/10/2023 88.3     MCH 06/10/2023 29.0     MCHC 06/10/2023 32.9 (L)     RDW 06/10/2023 14.4     Platelet 06/10/2023 381     MPV 06/10/2023 8.7     Neut % 06/10/2023 82.0     Lymph % 06/10/2023 8.4     Mono % 06/10/2023 8.3     Eos % 06/10/2023 0.1     Basophil % 06/10/2023 0.3     Lymph # 06/10/2023 1.28     Neut # 06/10/2023 12.42 (H)     Mono # 06/10/2023 1.25     Eos # 06/10/2023 0.02     Baso # 06/10/2023 0.04     IG# 06/10/2023 0.14 (H)     IG% 06/10/2023 0.9     NRBC% 06/10/2023 0.0     CULTURE, BLOOD ([facility]HS) 06/10/2023 No Growth At 72 Hours     CULTURE, BLOOD ([facility]HS) 06/10/2023 No Growth At 72 Hours             Chemotherapy Patient Education  Extensive chemotherapy education provided on the recommended treatment regimen of Gemcitabine 1000 mg/m2 and Abraxane 125 mg/m2 on day 1 and 15 every 28 days.    Patient was educated on the planned duration of the treatment and schedule of the treatment administration, as well as expected side effects and recommended symptom management. We also discussed side effects/symptoms that warrant patient to contact physician immediately.  Handouts provided.    He is scheduled for Mediport placement on 6/19/23 and cycle 1 6/20/23.    Zofran 8mg Q8 HOURS PRN NAUSEA prescribed.    He was also educated on  posttreatment laboratory monitoring and follow-up, and schedule provided.    Assessment:       Problem List Items Addressed This Visit          Oncology    Overlapping malignant neoplasm of pancreas - Primary    Relevant Medications    ondansetron (ZOFRAN) 8 MG tablet    Secondary malignant neoplasm of liver    Relevant Medications    ondansetron (ZOFRAN) 8 MG tablet          Plan:     Treatment Plan Information   OP PANC NAB-PACLITAXEL + GEMCITABINE Q4W   Marcel Newton MD   Upcoming Treatment Dates - OP PANC NAB-PACLITAXEL + GEMCITABINE Q4W    6/20/2023       Chemotherapy       PACLitaxeL protein-bound (ABRAXANE) 125 mg/m2 = 280 mg in 56 mL infusion       gemcitabine (GEMZAR) 2,240 mg in sodium chloride 0.9% SolP 250 mL chemo infusion       Antiemetics       ondansetron injection 8 mg  7/4/2023       Chemotherapy       PACLitaxeL protein-bound (ABRAXANE) 125 mg/m2 = 280 mg in 56 mL infusion       gemcitabine (GEMZAR) 2,240 mg in sodium chloride 0.9% SolP 250 mL chemo infusion       Antiemetics       ondansetron injection 8 mg  7/18/2023       Chemotherapy       PACLitaxeL protein-bound (ABRAXANE) 125 mg/m2 = 280 mg in 56 mL infusion       gemcitabine (GEMZAR) 2,240 mg in sodium chloride 0.9% SolP 250 mL chemo infusion       Antiemetics       ondansetron injection 8 mg  8/1/2023       Chemotherapy       PACLitaxeL protein-bound (ABRAXANE) 125 mg/m2 = 280 mg in 56 mL infusion       gemcitabine (GEMZAR) 2,240 mg in sodium chloride 0.9% SolP 250 mL chemo infusion       Antiemetics       ondansetron injection 8 mg                     Discussion  A total of 45 minutes were spent in counseling today, in which 100% were face-to-face.  Discussed tcancer diagnosis chemotherapy regimen, prognosis, side effects and toxicities.  A handout of each chemotherapeutic agent in the regimen was provided and reviewed in detail.     The following side effects were discussed but not limited to:   Low blood counts, risk of infection,  bleeding (spontaneous from nose, mouth, stool or urine).   GI side effects including weight changes, changes in appetite, altered sense of taste, stomatitis, nausea, vomiting, diarrhea, constipation, and heartburn.  Neurological side effects including the risk of peripheral neuropathy, either temporary or permanent.  Potential for skin, hair, and nail changes.      Discussed anti-emetic protocol and bowel regimen protocol.     Office contact information given, including after hours number.  Discussed there is an oncologist on call 24/7, 365 days including weekends.  Provided primary nurse's information .     Questions answered to their satisfaction.  Chemotherapy consent was reviewed and signed and will be scanned into the chart.    All questions answered.    YSABEL APONTE, FNP-C

## 2023-06-15 NOTE — NURSING
I met with Montez, his wife and their daughter for patient education. I introduced myself, explained my role and discussed his emotional wellbeing. He self-reported that he is feeling ok emotionally. He has had a lot of change in his life and he stated he feels like he is handling it as well as to be expected. He was glad to know I am available to him and so were his family members. I gave him a tour of the infusion room. [place] [facility] [place] be interested later. He expressed an interest in speaking to the dietician.

## 2023-06-16 PROBLEM — R33.9 URINARY RETENTION: Status: ACTIVE | Noted: 2023-01-01

## 2023-06-16 PROBLEM — R30.0 DYSURIA: Status: ACTIVE | Noted: 2023-01-01

## 2023-06-16 NOTE — PATIENT INSTRUCTIONS
Drink plenty of oral fluids.    Follow-up with your primary care provider if symptoms worsen or persist.    Take prescription medication as directed.   Go to ER for worsening confusion, urinary retention and decreased urine flow as instructed.

## 2023-06-16 NOTE — PROGRESS NOTES
"Subjective:      Patient ID: Ace Gomes is a 72 y.o. male.    Vitals:  height is 5' 11" (1.803 m) and weight is 98.9 kg (218 lb). His temperature is 99.1 °F (37.3 °C). His blood pressure is 133/66 and his pulse is 123 (abnormal). His respiration is 20 and oxygen saturation is 96%.     Chief Complaint: Urinary Frequency (Frequent urination, disoriented, fever (101.5 max) x within the last week. Dx with pancreatic cancer on May 19th. Blood clots in lungs earlier this month. )    72-year-old male presents with urinary retention, urinary frequency without dysuria.  Onset several days ago.  States feels like urinating at times but unable to.  Recent history of pancreatic cancer approximately 2 weeks ago.  Wife says he appeared to be confused home earlier,  but not confused on arrival here.  Awake alert and oriented x3.    Urinary Frequency   Associated symptoms include frequency and urgency. Pertinent negatives include no flank pain, hematuria, nausea or vomiting.     Gastrointestinal:  Positive for abdominal bloating. Negative for abdominal pain, nausea, vomiting and diarrhea.   Genitourinary:  Positive for dysuria, frequency, urgency and urine decreased. Negative for flank pain, hematuria, penile discharge, penile pain, penile swelling, scrotal swelling and testicular pain.    Objective:     Physical Exam   Constitutional: He is oriented to person, place, and time. He appears well-developed.   HENT:   Head: Normocephalic and atraumatic.   Ears:   Right Ear: External ear normal.   Left Ear: External ear normal.   Nose: Nose normal.   Mouth/Throat: Mucous membranes are normal.   Eyes: Conjunctivae and lids are normal.   Neck: Trachea normal. Neck supple.   Cardiovascular: Normal rate, regular rhythm and normal heart sounds.   Pulmonary/Chest: Effort normal and breath sounds normal. No respiratory distress.   Abdominal: Normal appearance and bowel sounds are normal. He exhibits distension. He exhibits no mass. Soft. " There is no guarding.   Musculoskeletal: Normal range of motion.         General: Normal range of motion.   Neurological: He is alert and oriented to person, place, and time. He has normal strength.   Skin: Skin is warm, dry, intact, not diaphoretic and not pale.   Psychiatric: His speech is normal and behavior is normal. Judgment and thought content normal.   Nursing note and vitals reviewed.    Patient here with his wife states does not want to go to ER, but inform patient for confusion, urinary retention with abdominal bloating may need further assessment.  Assessment:     1. Frequent urination    2. Urinary retention    3. Dysuria      Office Visit on 06/16/2023   Component Date Value Ref Range Status    POC Blood, Urine 06/16/2023 Negative  Negative Final    POC Bilirubin, Urine 06/16/2023 Positive (A)  Negative Final    1    POC Urobilinogen, Urine 06/16/2023 Positive  0.3 - 2.2 Final    8    POC Ketones, Urine 06/16/2023 Negative  Negative Final    POC Protein, Urine 06/16/2023 Positive (A)  Negative Final    30    POC Nitrates, Urine 06/16/2023 Negative  Negative Final    POC Glucose, Urine 06/16/2023 Positive (A)  Negative Final    50    pH, UA 06/16/2023 5   Final    POC Specific Gravity, Urine 06/16/2023 1.015  1.003 - 1.029 Final    POC Leukocytes, Urine 06/16/2023 Negative  Negative Final    POC Glucose 06/16/2023 252 (A)  70 - 110 MG/DL Final        Plan:   Drink plenty of oral fluids.    Follow-up with your primary care provider if symptoms worsen or persist.    Take prescription medication as directed.   Go to ER for worsening confusion, urinary retention and decreased urine flow as instructed.    Frequent urination  -     POCT Urinalysis, Dipstick, Manual, w/o Scope  -     POCT Glucose, Hand-Held Device    Urinary retention    Dysuria  -     Discontinue: ciprofloxacin HCl (CIPRO) 500 MG tablet; Take 1 tablet (500 mg total) by mouth every 12 (twelve) hours. for 10 days  Dispense: 20 tablet; Refill:  0  -     cephALEXin (KEFLEX) 500 MG capsule; Take 1 capsule (500 mg total) by mouth every 6 (six) hours. for 10 days  Dispense: 40 capsule; Refill: 0

## 2023-06-20 NOTE — PROGRESS NOTES
Oncology Nutrition Assessment for Medical Nutrition Therapy      Ace Gomes   1950    Oncology Provider:   Marcel Newton MD    Reason for Visit:  New Treatment Education    Oncology/Hematology Diagnosis:   Metastatic pancreatic cancer    Treatment Plan:  Gemcitabine 1000 mg/m2 and Abraxane 125 mg/m2 on day 1 and 15 every 28 days    Nutrition Recommendations:  1. Small frequent meals as tolerated, pt can liberalize diet as needed  2. Monitor for pancreatic enzyme insufficiency; pt has Creon script but has not filled yet  3. RD to continue monitoring    Nutrition Assessment    6/20/23: This is a 72 y.o.male with a medical diagnosis of metastatic pancreatic CA. He is here today for infusion. He reports decreased appetite, malaise, nausea, and wt loss of ~25# in the past 3-6 months. He does have diarrhea but this is not new or worsened recently, and has been present since rectal cancer removal 20 years ago. Reports no postprandial pain but does have gas/bloating. He was given Creon rx by Dr. Jorgensen but did not fill 2/2 cost. He is able to tolerate small amounts of food; avoids intake of high carb/sugar foods d/t hx of DM. His fasting glucose levels have been elevated.    Nutrition Factors Affecting Intake  altered gastrointestinal function, decreased appetite, and nausea    PMHx: HLD, HTN, DM, rectal CA with liver mets s/p colectomy and then liver resection sometime afterward    Allergies: Glyburide    Current Medications:    Current Outpatient Medications:     ALPRAZolam (XANAX) 0.5 MG tablet, Take 1 tablet (0.5 mg total) by mouth nightly as needed for Anxiety., Disp: 30 tablet, Rfl: 0    ALPRAZolam (XANAX) 0.5 MG tablet, Take 0.5 mg by mouth daily as needed., Disp: , Rfl:     apixaban (ELIQUIS) 5 mg Tab, Take 1 tablet (5 mg total) by mouth 2 (two) times daily., Disp: 30 tablet, Rfl: 3    apixaban (ELIQUIS) 5 mg Tab, Take 5 mg by mouth 2 (two) times daily., Disp: , Rfl:     cephALEXin (KEFLEX) 500 MG  capsule, Take 1 capsule (500 mg total) by mouth every 6 (six) hours. for 10 days, Disp: 40 capsule, Rfl: 0    clindamycin (CLEOCIN T) 1 % external solution, Apply topically 2 (two) times daily., Disp: , Rfl:     dulaglutide (TRULICITY) 1.5 mg/0.5 mL pen injector, INJECT 0.5ML SUBCUTANEOUS WEEKLY, Disp: 4 pen, Rfl: 3    famotidine (PEPCID) 40 MG tablet, Take 1 tablet (40 mg total) by mouth once daily. (Patient not taking: Reported on 6/13/2023), Disp: 90 tablet, Rfl: 3    famotidine (PEPCID) 40 MG tablet, Take 40 mg by mouth once daily., Disp: , Rfl:     felodipine (PLENDIL) 5 MG 24 hr tablet, TAKE ONE TABLET BY MOUTH EVERY DAY, Disp: 90 tablet, Rfl: 3    glyBURIDE (DIABETA) 5 MG tablet, Take 2 tablets (10 mg total) by mouth daily with breakfast., Disp: 180 tablet, Rfl: 3    hydroCHLOROthiazide (HYDRODIURIL) 12.5 MG Tab, TAKE ONE TABLET BY MOUTH EVERY DAY, Disp: 90 tablet, Rfl: 1    hydroCHLOROthiazide (HYDRODIURIL) 12.5 MG Tab, Take 12.5 mg by mouth once daily., Disp: , Rfl:     meloxicam (MOBIC) 15 MG tablet, Take 15 mg by mouth., Disp: , Rfl:     meloxicam (MOBIC) 15 MG tablet, Take 15 mg by mouth once daily., Disp: , Rfl:     metFORMIN (GLUCOPHAGE-XR) 500 MG ER 24hr tablet, Take 2 tablets (1,000 mg total) by mouth once daily., Disp: 60 tablet, Rfl: 3    metFORMIN (GLUCOPHAGE-XR) 500 MG ER 24hr tablet, Take 500 mg by mouth 2 (two) times daily., Disp: , Rfl:     moxifloxacin (VIGAMOX) 0.5 % ophthalmic solution, Place into both eyes., Disp: , Rfl:     moxifloxacin (VIGAMOX) 0.5 % ophthalmic solution, Place into both eyes., Disp: , Rfl:     ondansetron (ZOFRAN) 8 MG tablet, Take 1 tablet (8 mg total) by mouth every 8 (eight) hours as needed for Nausea., Disp: 30 tablet, Rfl: 2    pioglitazone (ACTOS) 30 MG tablet, Take 1 tablet (30 mg total) by mouth once daily., Disp: 90 tablet, Rfl: 3    pioglitazone (ACTOS) 30 MG tablet, Take 30 mg by mouth once daily., Disp: , Rfl:     prednisoLONE acetate (PRED FORTE) 1 % DrpS,  "Place into both eyes., Disp: , Rfl:     prednisoLONE acetate (PRED FORTE) 1 % DrpS, Place into both eyes., Disp: , Rfl:     PROLENSA 0.07 % Drop, SMARTSIG:In Eye(s), Disp: , Rfl:     PROLENSA 0.07 % Drop, SMARTSIG:In Eye(s), Disp: , Rfl:     simvastatin (ZOCOR) 40 MG tablet, TAKE ONE TABLET BY MOUTH AT BEDTIME, Disp: 90 tablet, Rfl: 2    simvastatin (ZOCOR) 40 MG tablet, Take 40 mg by mouth every evening., Disp: , Rfl:     TRULICITY 1.5 mg/0.5 mL pen injector, Inject into the skin., Disp: , Rfl:     UNABLE TO FIND, THC 10 mg chews tropical flavor, Disp: , Rfl:     UNABLE TO FIND, THC chews 125 mg, Disp: , Rfl:     UNABLE TO FIND, THC 12 mg night time chews, Disp: , Rfl:     ZENPEP 40,000-126,000- 168,000 unit CpDR, Take by mouth., Disp: , Rfl:     ZENPEP 40,000-126,000- 168,000 unit CpDR, Take by mouth., Disp: , Rfl:   No current facility-administered medications for this visit.    Labs: no new    Anthropometrics    Height:   Ht Readings from Last 1 Encounters:   06/20/23 5' 10.87" (1.8 m)      Weight:   Wt Readings from Last 5 Encounters:   06/20/23 98 kg (216 lb)   06/16/23 98.9 kg (218 lb)   06/14/23 99.3 kg (218 lb 14.4 oz)   06/13/23 98.9 kg (218 lb)   06/12/23 100 kg (220 lb 8 oz)        Usual Body Weight: 109 kg (240 lb)  % Weight Change: -10% past 3-6 months    BMI: 30.2 (obese I)    Ideal Weight: 78.1 kg (172 lb)  % Ideal Weight: 125%    Malnutrition in the context of chronic illness  Degree of Malnutrition: non-severe (moderate) malnutrition  Energy Intake: < 75% of estimated energy requirement for >/= 1 month  Interpretation of Weight Loss: 10% in 6 months  Body Fat: mild depletion  Area of Body Fat Loss: orbital region , upper arm region - triceps / biceps, and thoracic and lumbar region - ribs, lower back, midaxillary line  Muscle Mass Loss: mild depletion  Area of Muscle Mass Loss: temple region - temporalis muscle, clavicle bone region - pectoralis major, deltoid, trapezius muscles, clavicle and " acromion bone region - deltoid muscle, scapular bone region - trapezius, supraspinus, infraspinus muscles, dorsal hand - interosseous musle, patellar region - quadricep muscle, anterior thigh region - quadriceps muscles, and posterior calf region - gastrocnemius muscle  Fluid Accumulation: does not meet criteria  Edema: no edema present  Reduced  Strength: unable to obtain  A minimum of two characteristics is recommended for diagnosis of either severe or non-severe malnutrition.    Estimated Needs (using 98 kg)  2502 kcal/day  [place] St. Jeor x 1.1 activity factor x 1.3 stress factor  127 g protein/day 1.3 g/kg CBW  2502 mL fluid/day 1 mL/kcal    Nutrition Diagnosis    PES: Malnutrition related to acute illness as evidenced by <75% intake est needs >/=1 month, 10% wt loss 6 months, mild muscle/fat wasting. (new)     Nutrition Risk  high    Nutrition Intervention    Interventions(treatment strategy):  modified composition of meals/snacks, prescription medication, purpose of nutrition education, and collaboration with other providers    Nutrition Education    Education Provided: antimicrobial/neutropenic and small frequent meals  Teaching Method: explanation and printed materials  Comprehension: verbalizes understanding  Barriers to Learning: none evident  Expected Compliance: good  Comments: All questions were answered and dietitian's contact information was provided.    Nutrition Monitoring and Evaluation    Monitor: food and beverage intake, weight change, and electrolyte/renal panel    Follow up at next provider visit.        Zuleika Encinas MS, RD, , LDN

## 2023-07-11 ENCOUNTER — TELEPHONE (OUTPATIENT)
Dept: INTERNAL MEDICINE | Facility: CLINIC | Age: 73
End: 2023-07-11
Payer: MEDICARE

## 2023-07-11 DIAGNOSIS — C78.7 SECONDARY MALIGNANT NEOPLASM OF LIVER: ICD-10-CM

## 2023-07-11 DIAGNOSIS — E13.9 OTHER SPECIFIED DIABETES MELLITUS WITHOUT COMPLICATION, WITHOUT LONG-TERM CURRENT USE OF INSULIN: Primary | ICD-10-CM

## 2023-07-11 RX ORDER — PIOGLITAZONEHYDROCHLORIDE 30 MG/1
TABLET ORAL
Qty: 90 TABLET | Refills: 3 | Status: SHIPPED | OUTPATIENT
Start: 2023-07-11

## 2023-07-11 NOTE — TELEPHONE ENCOUNTER
----- Message from Lacy Dunn sent at 7/11/2023 11:51 AM CDT -----  Pt wife called to say pt passed away yesterday and wants to say thank the staff and pcp and Lanie for everything

## 2023-07-12 RX ORDER — PIOGLITAZONEHYDROCHLORIDE 30 MG/1
TABLET ORAL
Qty: 90 TABLET | Refills: 3 | OUTPATIENT
Start: 2023-07-12